# Patient Record
Sex: MALE | Race: WHITE | Employment: FULL TIME | URBAN - NONMETROPOLITAN AREA
[De-identification: names, ages, dates, MRNs, and addresses within clinical notes are randomized per-mention and may not be internally consistent; named-entity substitution may affect disease eponyms.]

---

## 2018-01-22 ENCOUNTER — OFFICE VISIT (OUTPATIENT)
Dept: FAMILY MEDICINE CLINIC | Age: 48
End: 2018-01-22

## 2018-01-22 VITALS
BODY MASS INDEX: 22.08 KG/M2 | HEIGHT: 72 IN | DIASTOLIC BLOOD PRESSURE: 66 MMHG | SYSTOLIC BLOOD PRESSURE: 101 MMHG | HEART RATE: 68 BPM | WEIGHT: 163 LBS | OXYGEN SATURATION: 94 %

## 2018-01-22 DIAGNOSIS — Z71.6 ENCOUNTER FOR SMOKING CESSATION COUNSELING: ICD-10-CM

## 2018-01-22 DIAGNOSIS — Z23 NEED FOR IMMUNIZATION AGAINST INFLUENZA: ICD-10-CM

## 2018-01-22 DIAGNOSIS — Z13.1 SCREENING FOR DIABETES MELLITUS: ICD-10-CM

## 2018-01-22 DIAGNOSIS — Z23 NEED FOR TETANUS BOOSTER: ICD-10-CM

## 2018-01-22 DIAGNOSIS — M79.671 FOOT PAIN, RIGHT: ICD-10-CM

## 2018-01-22 DIAGNOSIS — B07.0 PLANTAR WART OF RIGHT FOOT: ICD-10-CM

## 2018-01-22 DIAGNOSIS — Z23 NEED FOR STREPTOCOCCUS PNEUMONIAE VACCINATION: ICD-10-CM

## 2018-01-22 DIAGNOSIS — K21.9 GASTROESOPHAGEAL REFLUX DISEASE WITHOUT ESOPHAGITIS: ICD-10-CM

## 2018-01-22 DIAGNOSIS — Z13.0 SCREENING FOR DEFICIENCY ANEMIA: ICD-10-CM

## 2018-01-22 DIAGNOSIS — Z13.220 SCREENING FOR LIPID DISORDERS: ICD-10-CM

## 2018-01-22 DIAGNOSIS — J06.9 VIRAL UPPER RESPIRATORY INFECTION: Primary | ICD-10-CM

## 2018-01-22 LAB
A/G RATIO: 2.6 (ref 1.1–2.2)
ALBUMIN SERPL-MCNC: 4.4 G/DL (ref 3.4–5)
ALP BLD-CCNC: 67 U/L (ref 40–129)
ALT SERPL-CCNC: 14 U/L (ref 10–40)
ANION GAP SERPL CALCULATED.3IONS-SCNC: 9 MMOL/L (ref 3–16)
AST SERPL-CCNC: 16 U/L (ref 15–37)
BASOPHILS ABSOLUTE: 0.1 K/UL (ref 0–0.2)
BASOPHILS RELATIVE PERCENT: 0.8 %
BILIRUB SERPL-MCNC: 0.3 MG/DL (ref 0–1)
BUN BLDV-MCNC: 5 MG/DL (ref 7–20)
CALCIUM SERPL-MCNC: 9.4 MG/DL (ref 8.3–10.6)
CHLORIDE BLD-SCNC: 103 MMOL/L (ref 99–110)
CHOLESTEROL, TOTAL: 131 MG/DL (ref 0–199)
CO2: 30 MMOL/L (ref 21–32)
CREAT SERPL-MCNC: 1 MG/DL (ref 0.9–1.3)
EOSINOPHILS ABSOLUTE: 0.3 K/UL (ref 0–0.6)
EOSINOPHILS RELATIVE PERCENT: 2.9 %
GFR AFRICAN AMERICAN: >60
GFR NON-AFRICAN AMERICAN: >60
GLOBULIN: 1.7 G/DL
GLUCOSE BLD-MCNC: 73 MG/DL (ref 70–99)
HCT VFR BLD CALC: 44.2 % (ref 40.5–52.5)
HDLC SERPL-MCNC: 47 MG/DL (ref 40–60)
HEMOGLOBIN: 14.8 G/DL (ref 13.5–17.5)
LDL CHOLESTEROL CALCULATED: 59 MG/DL
LYMPHOCYTES ABSOLUTE: 2.3 K/UL (ref 1–5.1)
LYMPHOCYTES RELATIVE PERCENT: 24.6 %
MCH RBC QN AUTO: 32.9 PG (ref 26–34)
MCHC RBC AUTO-ENTMCNC: 33.5 G/DL (ref 31–36)
MCV RBC AUTO: 98.2 FL (ref 80–100)
MONOCYTES ABSOLUTE: 0.6 K/UL (ref 0–1.3)
MONOCYTES RELATIVE PERCENT: 6.8 %
NEUTROPHILS ABSOLUTE: 6.1 K/UL (ref 1.7–7.7)
NEUTROPHILS RELATIVE PERCENT: 64.9 %
PDW BLD-RTO: 13.3 % (ref 12.4–15.4)
PLATELET # BLD: 216 K/UL (ref 135–450)
PMV BLD AUTO: 9.6 FL (ref 5–10.5)
POTASSIUM SERPL-SCNC: 4.2 MMOL/L (ref 3.5–5.1)
RBC # BLD: 4.5 M/UL (ref 4.2–5.9)
SODIUM BLD-SCNC: 142 MMOL/L (ref 136–145)
TOTAL PROTEIN: 6.1 G/DL (ref 6.4–8.2)
TRIGL SERPL-MCNC: 123 MG/DL (ref 0–150)
VLDLC SERPL CALC-MCNC: 25 MG/DL
WBC # BLD: 9.5 K/UL (ref 4–11)

## 2018-01-22 PROCEDURE — 90688 IIV4 VACCINE SPLT 0.5 ML IM: CPT | Performed by: NURSE PRACTITIONER

## 2018-01-22 PROCEDURE — 99204 OFFICE O/P NEW MOD 45 MIN: CPT | Performed by: NURSE PRACTITIONER

## 2018-01-22 PROCEDURE — 90732 PPSV23 VACC 2 YRS+ SUBQ/IM: CPT | Performed by: NURSE PRACTITIONER

## 2018-01-22 PROCEDURE — 90472 IMMUNIZATION ADMIN EACH ADD: CPT | Performed by: NURSE PRACTITIONER

## 2018-01-22 PROCEDURE — 17110 DESTRUCTION B9 LES UP TO 14: CPT | Performed by: NURSE PRACTITIONER

## 2018-01-22 PROCEDURE — 90471 IMMUNIZATION ADMIN: CPT | Performed by: NURSE PRACTITIONER

## 2018-01-22 PROCEDURE — 90715 TDAP VACCINE 7 YRS/> IM: CPT | Performed by: NURSE PRACTITIONER

## 2018-01-22 RX ORDER — DEXLANSOPRAZOLE 60 MG/1
60 CAPSULE, DELAYED RELEASE ORAL DAILY
Qty: 10 CAPSULE | Refills: 0 | COMMUNITY
Start: 2018-01-22 | End: 2018-05-01

## 2018-01-22 RX ORDER — METHYLPREDNISOLONE 4 MG/1
TABLET ORAL
Qty: 1 KIT | Refills: 0 | Status: SHIPPED | OUTPATIENT
Start: 2018-01-22 | End: 2018-01-28

## 2018-01-22 RX ORDER — NICOTINE 21 MG/24HR
1 PATCH, TRANSDERMAL 24 HOURS TRANSDERMAL EVERY 24 HOURS
Qty: 30 PATCH | Refills: 1 | Status: SHIPPED | OUTPATIENT
Start: 2018-01-22 | End: 2018-03-29

## 2018-01-22 RX ORDER — GUAIFENESIN AND DEXTROMETHORPHAN HYDROBROMIDE 100; 10 MG/5ML; MG/5ML
5-10 SOLUTION ORAL EVERY 6 HOURS PRN
Qty: 100 ML | Refills: 0 | Status: SHIPPED | OUTPATIENT
Start: 2018-01-22 | End: 2018-02-01

## 2018-01-22 RX ORDER — OMEPRAZOLE 20 MG/1
20 CAPSULE, DELAYED RELEASE ORAL DAILY
Qty: 30 CAPSULE | Refills: 1 | Status: SHIPPED | OUTPATIENT
Start: 2018-01-22 | End: 2018-05-01 | Stop reason: ALTCHOICE

## 2018-01-22 ASSESSMENT — ENCOUNTER SYMPTOMS
STRIDOR: 0
GASTROINTESTINAL NEGATIVE: 1
EYES NEGATIVE: 1
WHEEZING: 0
VOICE CHANGE: 1
COUGH: 1
CHEST TIGHTNESS: 1
SINUS PRESSURE: 1
RHINORRHEA: 1
SORE THROAT: 1
SHORTNESS OF BREATH: 0

## 2018-01-22 ASSESSMENT — PATIENT HEALTH QUESTIONNAIRE - PHQ9
SUM OF ALL RESPONSES TO PHQ9 QUESTIONS 1 & 2: 0
1. LITTLE INTEREST OR PLEASURE IN DOING THINGS: 0
2. FEELING DOWN, DEPRESSED OR HOPELESS: 0
SUM OF ALL RESPONSES TO PHQ QUESTIONS 1-9: 0

## 2018-01-22 NOTE — PATIENT INSTRUCTIONS
self-care. Antibiotics are not used to treat a viral infection. That's because antibiotics will not help cure a viral illness. In some cases, antiviral medicine can help your body fight a serious viral infection. Follow-up care is a key part of your treatment and safety. Be sure to make and go to all appointments, and call your doctor if you are having problems. It's also a good idea to know your test results and keep a list of the medicines you take. How can you care for yourself at home? · Rest as much as possible until you feel better. · Be safe with medicines. Take your medicine exactly as prescribed. Call your doctor if you think you are having a problem with your medicine. You will get more details on the specific medicine your doctor prescribes. · Take an over-the-counter pain medicine, such as acetaminophen (Tylenol), ibuprofen (Advil, Motrin), or naproxen (Aleve), as needed for pain and fever. Read and follow all instructions on the label. Do not give aspirin to anyone younger than 20. It has been linked to Reye syndrome, a serious illness. · Drink plenty of fluids, enough so that your urine is light yellow or clear like water. Hot fluids, such as tea or soup, may help relieve congestion in your nose and throat. If you have kidney, heart, or liver disease and have to limit fluids, talk with your doctor before you increase the amount of fluids you drink. · Try to clear mucus from your lungs by breathing deeply and coughing. · Gargle with warm salt water once an hour. This can help reduce swelling and throat pain. Use 1 teaspoon of salt mixed in 1 cup of warm water. · Do not smoke or allow others to smoke around you. If you need help quitting, talk to your doctor about stop-smoking programs and medicines. These can increase your chances of quitting for good. To avoid spreading the virus  · Cough or sneeze into a tissue. Then throw the tissue away.   · If you don't have a tissue, use your hand to

## 2018-01-22 NOTE — PROGRESS NOTES
Vaccine Information Sheet, \"Influenza - Inactivated\"  given to Shahid Irizarry, or parent/legal guardian of  Shahid Irizarry and verbalized understanding. Patient responses:    Have you ever had a reaction to a flu vaccine? No  Are you able to eat eggs without adverse effects? Yes  Do you have any current illness? No  Have you ever had Guillian [place] Syndrome? No    Flu vaccine given per order. Please see immunization tab. Lab preformed in R arm and sent number of tubes below to be processed. 1 attempt made and stopped bleeding by applying band aide and guaze.      1 Red    1 purple    0 blue    0 Urine

## 2018-01-22 NOTE — PROGRESS NOTES
[facility] PHYSICIAN PRACTICES  [facility]  [place] [address]  [address] [address]  Dept: [phone]  Dept Fax: [phone]    Harrold Collet is a 56 y.o. male who presents today for his medical conditions/complaints as noted below. Harrold Collet is c/o of Established New Doctor; Cough (x 2 days ); and Abdominal Pain (burning)    Chief Complaint   Patient presents with   Heather oFng Established New Doctor    Cough     x 2 days     Abdominal Pain     burning     HPI:     Mr. Gorge Purcell presents to establish care within this practice. He hasn't been to a doctor in some time and is behind on some aspects of his health maintenance in which he wishes to get caught up on today. His primary presenting issue, however is a cough as noted below. Cough   This is a new problem. The current episode started in the past 7 days (Friday). The problem has been waxing and waning. The problem occurs constantly. The cough is non-productive. Associated symptoms include chest pain, nasal congestion, postnasal drip, rhinorrhea and a sore throat. Pertinent negatives include no shortness of breath or wheezing. The symptoms are aggravated by lying down. Risk factors for lung disease include occupational exposure and smoking/tobacco exposure. He has tried nothing for the symptoms. The treatment provided no relief. His past medical history is significant for asthma and environmental allergies.  smoking     Past Medical History:   Diagnosis Date    MRSA (methicillin resistant staph aureus) culture positive 9/9/11    Finger positive for MRSA    Recurrent otitis media of both ears     Smoking history       Past Surgical History:   Procedure Laterality Date    ORTHOPEDIC SURGERY      L elbow    TYMPANOSTOMY TUBE PLACEMENT      15 tubes in right, 16 tubes in left over the years     Family History   Problem Relation Age of Onset    Heart Attack Father      5 MI; ~40 at first MI   Heather Fong High Cholesterol Father     Asthma Father    Heather Fong to person, place, and time. He appears well-developed and well-nourished. He does not have a sickly appearance. He does not appear ill. HENT:   Head: Normocephalic. Mouth/Throat: No oral lesions. Posterior oropharyngeal erythema present. No oropharyngeal exudate. Eyes: Pupils are equal, round, and reactive to light. Neck: Normal range of motion. Cardiovascular: Normal rate, regular rhythm, S1 normal, S2 normal and normal heart sounds. Pulmonary/Chest: Effort normal and breath sounds normal.   Abdominal: Soft. Bowel sounds are normal.   Musculoskeletal: Normal range of motion. Lymphadenopathy:        Head (right side): Submandibular adenopathy present. Head (left side): Submandibular adenopathy present. Neurological: He is alert and oriented to person, place, and time. Skin: Skin is warm and dry. Psychiatric: He has a normal mood and affect.      Admission on 04/28/2015, Discharged on 04/28/2015   Component Date Value Ref Range Status    Ventricular Rate 04/28/2015 77  BPM Final    Atrial Rate 04/28/2015 77  BPM Final    P-R Interval 04/28/2015 170  ms Final    QRS Duration 04/28/2015 110  ms Final    Q-T Interval 04/28/2015 386  ms Final    QTc Calculation (Bazett) 04/28/2015 436  ms Final    P Axis 04/28/2015 74  degrees Final    R Axis 04/28/2015 -37  degrees Final    T Axis 04/28/2015 61  degrees Final    Diagnosis 04/28/2015 Normal sinus rhythmLeft axis deviationIncomplete right bundle branch blockAbnormal ECGConfirmed by SERJIO BALDWIN MD ([id number]) on 4/28/2015 11:01:25 AM   Final    WBC 04/28/2015 11.5* 4.0 - 11.0 K/uL Final    RBC 04/28/2015 4.84  4.20 - 5.90 M/uL Final    Hemoglobin 04/28/2015 15.6  13.5 - 17.5 g/dL Final    Hematocrit 04/28/2015 46.2  40.5 - 52.5 % Final    MCV 04/28/2015 95.3  80.0 - 100.0 fL Final    MCH 04/28/2015 32.3  26.0 - 34.0 pg Final    MCHC 04/28/2015 33.9  31.0 - 36.0 g/dL Final    RDW 04/28/2015 12.7  12.4 - 15.4 % Final    Platelets

## 2018-03-29 ENCOUNTER — OFFICE VISIT (OUTPATIENT)
Dept: FAMILY MEDICINE CLINIC | Age: 48
End: 2018-03-29

## 2018-03-29 VITALS
DIASTOLIC BLOOD PRESSURE: 88 MMHG | OXYGEN SATURATION: 98 % | SYSTOLIC BLOOD PRESSURE: 121 MMHG | BODY MASS INDEX: 22.24 KG/M2 | WEIGHT: 164 LBS | HEART RATE: 62 BPM

## 2018-03-29 DIAGNOSIS — B07.0 PLANTAR WART, RIGHT FOOT: Primary | ICD-10-CM

## 2018-03-29 PROCEDURE — 99213 OFFICE O/P EST LOW 20 MIN: CPT | Performed by: NURSE PRACTITIONER

## 2018-03-29 PROCEDURE — 17110 DESTRUCTION B9 LES UP TO 14: CPT | Performed by: NURSE PRACTITIONER

## 2018-03-29 ASSESSMENT — ENCOUNTER SYMPTOMS
COLOR CHANGE: 0
SHORTNESS OF BREATH: 0
SORE THROAT: 0
CHEST TIGHTNESS: 0
SINUS PAIN: 0

## 2018-03-29 NOTE — PROGRESS NOTES
[facility] PHYSICIAN PRACTICES  [facility]  [place] [address]  Dept: [phone]  Dept Fax: [phone]    Andrea Vega is a 56 y.o. male who presents today for his medical conditions/complaints as noted below. Andrea Vega is c/o of Foot Problem (right foot/ warts )    Chief Complaint   Patient presents with    Foot Problem     right foot/ warts      HPI:     Foot Problem   This is a recurrent problem. The current episode started more than 1 month ago (6 months). The problem occurs constantly. The problem has been gradually worsening. Pertinent negatives include no arthralgias, chest pain, congestion, fatigue, myalgias, rash, sore throat or weakness. Associated symptoms comments: Foot pain with pressure  . The symptoms are aggravated by walking. Treatments tried: cryotherapy, filing, corn cushions. The treatment provided no relief. Subjective:     Review of Systems   Constitutional: Negative for activity change and fatigue. HENT: Negative for congestion, sinus pain and sore throat. Respiratory: Negative for chest tightness and shortness of breath. Cardiovascular: Negative for chest pain, palpitations and leg swelling. Musculoskeletal: Negative for arthralgias and myalgias. Skin: Negative for color change, pallor, rash and wound. Plantar warts to the right foot     Neurological: Negative for dizziness, weakness and light-headedness. Objective:     Vitals:    03/29/18 1714   BP: 121/88   Site: Right Arm   Position: Sitting   Cuff Size: Small Adult   Pulse: 62   SpO2: 98%   Weight: 164 lb (74.4 kg)     Physical Exam   Constitutional: He appears well-developed and well-nourished. HENT:   Right Ear: External ear normal.   Left Ear: External ear normal.   Nose: Nose normal.   Mouth/Throat: Oropharynx is clear and moist.   Eyes: Pupils are equal, round, and reactive to light. Neck: Normal range of motion.    Cardiovascular: Normal rate, regular

## 2018-03-29 NOTE — PATIENT INSTRUCTIONS
comfortable. · Take an over-the-counter medicine, such as acetaminophen (Tylenol), ibuprofen (Advil, Motrin), or naproxen (Aleve) if you have pain. Read and follow all instructions on the label. · Do not take two or more pain medicines at the same time unless the doctor told you to. Many pain medicines have acetaminophen, which is Tylenol. Too much acetaminophen (Tylenol) can be harmful. When should you call for help? Call your doctor now or seek immediate medical care if:  ? · You have signs of infection, such as:  ¨ Increased pain, swelling, warmth, or redness. ¨ Red streaks leading from a wart. ¨ Pus draining from a wart. ¨ A fever. ? Watch closely for changes in your health, and be sure to contact your doctor if:  ? · You do not get better as expected. Where can you learn more? Go to [email or web address].com.[email or web address]. org and sign in to your [employer] account. Enter S429 in the [employer] box to learn more about \"Plantar Warts: Care Instructions. \"     If you do not have an account, please click on the \"Sign Up Now\" link. Current as of: October 13, 2016  Content Version: 11.5  © 1239-9184 Healthwise, Incorporated. Care instructions adapted under license by [facility] ([facility]). If you have questions about a medical condition or this instruction, always ask your healthcare professional. [place] [address] any warranty or liability for your use of this information.

## 2018-05-01 ENCOUNTER — OFFICE VISIT (OUTPATIENT)
Dept: FAMILY MEDICINE CLINIC | Age: 48
End: 2018-05-01

## 2018-05-01 VITALS
HEIGHT: 72 IN | DIASTOLIC BLOOD PRESSURE: 78 MMHG | SYSTOLIC BLOOD PRESSURE: 116 MMHG | WEIGHT: 162 LBS | OXYGEN SATURATION: 97 % | HEART RATE: 67 BPM | BODY MASS INDEX: 21.94 KG/M2 | TEMPERATURE: 97.3 F

## 2018-05-01 DIAGNOSIS — H66.004 RECURRENT ACUTE SUPPURATIVE OTITIS MEDIA OF RIGHT EAR WITHOUT SPONTANEOUS RUPTURE OF TYMPANIC MEMBRANE: Primary | ICD-10-CM

## 2018-05-01 DIAGNOSIS — J01.91 ACUTE RECURRENT SINUSITIS, UNSPECIFIED LOCATION: ICD-10-CM

## 2018-05-01 PROCEDURE — 99214 OFFICE O/P EST MOD 30 MIN: CPT | Performed by: NURSE PRACTITIONER

## 2018-05-01 RX ORDER — FLUTICASONE PROPIONATE 50 MCG
1 SPRAY, SUSPENSION (ML) NASAL DAILY
Qty: 1 BOTTLE | Refills: 3 | Status: SHIPPED | OUTPATIENT
Start: 2018-05-01 | End: 2019-07-17

## 2018-05-01 RX ORDER — AMOXICILLIN 875 MG/1
875 TABLET, COATED ORAL 2 TIMES DAILY
Qty: 14 TABLET | Refills: 0 | Status: SHIPPED | OUTPATIENT
Start: 2018-05-01 | End: 2018-05-08

## 2018-05-01 ASSESSMENT — ENCOUNTER SYMPTOMS
CONSTIPATION: 0
RHINORRHEA: 0
VOICE CHANGE: 1
SORE THROAT: 0
COUGH: 0
SINUS PRESSURE: 1
DIARRHEA: 0
CHEST TIGHTNESS: 0
VOMITING: 0
SINUS PAIN: 0
PHOTOPHOBIA: 0
NAUSEA: 0
SHORTNESS OF BREATH: 0

## 2018-07-23 ENCOUNTER — HOSPITAL ENCOUNTER (EMERGENCY)
Age: 48
Discharge: HOME | End: 2018-07-23
Attending: EMERGENCY MEDICINE
Payer: COMMERCIAL

## 2018-07-23 VITALS
WEIGHT: 165 LBS | TEMPERATURE: 97.8 F | HEART RATE: 70 BPM | SYSTOLIC BLOOD PRESSURE: 130 MMHG | HEIGHT: 72 IN | OXYGEN SATURATION: 99 % | RESPIRATION RATE: 16 BRPM | DIASTOLIC BLOOD PRESSURE: 85 MMHG | BODY MASS INDEX: 22.35 KG/M2

## 2018-07-23 DIAGNOSIS — S61.215A LACERATION OF LEFT RING FINGER WITHOUT FOREIGN BODY WITHOUT DAMAGE TO NAIL, INITIAL ENCOUNTER: ICD-10-CM

## 2018-07-23 DIAGNOSIS — S61.217A LACERATION OF LEFT LITTLE FINGER WITHOUT FOREIGN BODY WITHOUT DAMAGE TO NAIL, INITIAL ENCOUNTER: Primary | ICD-10-CM

## 2018-07-23 PROCEDURE — 4500000022 HC ED LEVEL 2 PROCEDURE

## 2018-07-23 PROCEDURE — 99282 EMERGENCY DEPT VISIT SF MDM: CPT

## 2018-07-23 NOTE — ED NOTES
Patient wound cleaned and dressed post suture per Esthela Alert, EMT-P. Patient given discharge instructions and instructions for wound care and verbalized understanding.      Flavio Griffin RN  07/23/18 5065

## 2018-08-05 ENCOUNTER — HOSPITAL ENCOUNTER (EMERGENCY)
Age: 48
Discharge: HOME | End: 2018-08-05
Attending: EMERGENCY MEDICINE
Payer: COMMERCIAL

## 2018-08-05 VITALS
BODY MASS INDEX: 22.75 KG/M2 | WEIGHT: 168 LBS | HEART RATE: 84 BPM | OXYGEN SATURATION: 84 % | TEMPERATURE: 98.3 F | HEIGHT: 72 IN | SYSTOLIC BLOOD PRESSURE: 146 MMHG | DIASTOLIC BLOOD PRESSURE: 79 MMHG | RESPIRATION RATE: 16 BRPM

## 2018-08-05 DIAGNOSIS — L02.511 ABSCESS OF RIGHT MIDDLE FINGER: ICD-10-CM

## 2018-08-05 DIAGNOSIS — S60.452A FOREIGN BODY OF RIGHT MIDDLE FINGER: Primary | ICD-10-CM

## 2018-08-05 PROCEDURE — 4500000023 HC ED LEVEL 3 PROCEDURE

## 2018-08-05 PROCEDURE — 6370000000 HC RX 637 (ALT 250 FOR IP): Performed by: EMERGENCY MEDICINE

## 2018-08-05 PROCEDURE — 99283 EMERGENCY DEPT VISIT LOW MDM: CPT

## 2018-08-05 RX ORDER — CEFDINIR 300 MG/1
300 CAPSULE ORAL ONCE
Status: COMPLETED | OUTPATIENT
Start: 2018-08-05 | End: 2018-08-05

## 2018-08-05 RX ORDER — CEFDINIR 300 MG/1
300 CAPSULE ORAL 2 TIMES DAILY
Qty: 20 CAPSULE | Refills: 0 | Status: SHIPPED | OUTPATIENT
Start: 2018-08-05 | End: 2020-05-20 | Stop reason: SDUPTHER

## 2018-08-05 RX ADMIN — CEFDINIR 300 MG: 300 CAPSULE ORAL at 18:12

## 2018-08-05 NOTE — ED NOTES
Wound care done to right hand and dressing in place. Sutures removed from left hand. Pt voices understanding of medicaiton use and follow up. Voices understanding of wound care.  Denies any questions     Jack Travis, SOFIA  08/05/18 6115

## 2018-08-08 NOTE — ED PROVIDER NOTES
 Recurrent otitis media of both ears     Smoking history          SURGICAL HISTORY       Past Surgical History:   Procedure Laterality Date    ORTHOPEDIC SURGERY      L elbow    TYMPANOSTOMY TUBE PLACEMENT      15 tubes in right, 16 tubes in left over the years         CURRENT MEDICATIONS       Discharge Medication List as of 8/5/2018  5:58 PM      CONTINUE these medications which have NOT CHANGED    Details   fluticasone (FLONASE) 50 MCG/ACT nasal spray 1 spray by Nasal route daily, Disp-1 Bottle, R-3Normal               ALLERGIES     Pcn [penicillins]    FAMILY HISTORY       Family History   Problem Relation Age of Onset    Heart Attack Father         5 MI; ~40 at first MI   Corazon Lima High Cholesterol Father     Asthma Father     Diabetes Father     Hypertension Father     No Known Problems Brother     Mult Sclerosis Maternal Grandfather           SOCIAL HISTORY       Social History     Social History    Marital status: Legally      Spouse name: N/A    Number of children: N/A    Years of education: N/A     Social History Main Topics    Smoking status: Current Every Day Smoker     Packs/day: 1.00     Types: Cigarettes    Smokeless tobacco: Current User     Types: Chew    Alcohol use Yes      Comment: only occasionally    Drug use: No    Sexual activity: Yes     Partners: Female     Other Topics Concern    None     Social History Narrative    None       SCREENINGS             PHYSICAL EXAM    (up to 7 for level 4, 8 or more for level 5)     ED Triage Vitals [08/05/18 1716]   BP Temp Temp Source Pulse Resp SpO2 Height Weight   (!) 146/79 98.3 °F (36.8 °C) Oral 84 16 (!) 84 % 6' (1.829 m) 168 lb (76.2 kg)       Physical Exam   Constitutional: He is oriented to person, place, and time. He appears well-developed and well-nourished. No distress. Musculoskeletal: Normal range of motion. Right hand: He exhibits tenderness and swelling. Normal sensation noted.         Hands:  Neurological: He is alert and oriented to person, place, and time. Skin: Skin is warm and dry. DIAGNOSTIC RESULTS   LABS:    No results found for this visit on 08/05/18. All other labs were within normal range or not returned as of this dictation. EKG: All EKG's are interpreted by the Emergency Department Physician who either signs or Co-signs this chart in the absence of a cardiologist.  Please see their note for interpretation of EKG. RADIOLOGY:   Non-plain film images such as CT, Ultrasound and MRI are read by the radiologist. Plain radiographic images are visualized and preliminarily interpreted by the  ED Provider with the below findings:    Interpretation per the Radiologist below, if available at the time of this note:    No orders to display         PROCEDURES   Unless otherwise noted below, none     Incision/Drainage  Date/Time: 8/8/2018 4:14 PM  Performed by: Jodi Lake by: Meche Ding     Consent:     Consent obtained:  Verbal    Consent given by:  Patient    Risks discussed:  Bleeding, incomplete drainage, pain and infection    Alternatives discussed:  No treatment, observation and referral  Location:     Type:  Abscess    Location:  Upper extremity    Upper extremity location:  Finger    Finger location:  R long finger  Pre-procedure details:     Skin preparation:  Betadine  Anesthesia (see MAR for exact dosages):      Anesthesia method:  Nerve block and local infiltration    Local anesthetic:  Lidocaine 1% w/o epi    Block location:  Right middle finger proximally    Block needle gauge:  30 G    Block anesthetic:  Lidocaine 1% w/o epi and bupivacaine 0.25% w/o epi    Block injection procedure:  Anatomic landmarks identified, introduced needle, incremental injection and negative aspiration for blood    Block outcome:  Incomplete block  Procedure type:     Complexity:  Complex  Procedure details:     Needle aspiration: no      Incision types:  Single straight    Incision depth: Dermal    Scalpel blade:  11    Wound management:  Probed and deloculated, extensive cleaning and debrided (small wood splinter removed)    Drainage:  Bloody and purulent    Drainage amount: Moderate    Wound treatment:  Wound left open    Packing materials:  None  Post-procedure details:     Patient tolerance of procedure: Tolerated well, no immediate complications        CRITICAL CARE TIME   N/A    CONSULTS:  None      EMERGENCY DEPARTMENT COURSE and DIFFERENTIAL DIAGNOSIS/MDM:   Vitals:    Vitals:    08/05/18 1716   BP: (!) 146/79   Pulse: 84   Resp: 16   Temp: 98.3 °F (36.8 °C)   TempSrc: Oral   SpO2: (!) 84%   Weight: 168 lb (76.2 kg)   Height: 6' (1.829 m)       Patient was given the following medications:  Medications   cefdinir (OMNICEF) capsule 300 mg (300 mg Oral Given 8/5/18 1812)     Patient had retained foreign body of his right middle finger. This was removed. The wound was copiously irrigated. I started him on antibiotics. I have gone over signs and symptoms of worsening infection. I want him to follow up with hand surgery closely and I provided referral.  Patient will need to follow-up closely. Patient and his wife are agreeable with this plan. I estimate there is LOW risk for CELLULITIS, COMPARTMENT SYNDROME, NECROTIZING FASCIITIS, TENDON OR NEUROVASCULAR INJURY, or FOREIGN BODY, thus I consider the discharge disposition reasonable. Also, there is no evidence or peritonitis, sepsis, or toxicity. Chago Sainz and I have discussed the diagnosis and risks, and we agree with discharging home to follow-up with their primary doctor. We also discussed returning to the Emergency Department immediately if new or worsening symptoms occur. We have discussed the symptoms which are most concerning (e.g., changing or worsening pain, fever, numbness, weakness, cool or painful digits) that necessitate immediate return. The patient understands the importance of follow up and reasons to return.     FINAL

## 2019-01-10 ENCOUNTER — OFFICE VISIT (OUTPATIENT)
Dept: FAMILY MEDICINE CLINIC | Age: 49
End: 2019-01-10
Payer: COMMERCIAL

## 2019-01-10 VITALS
TEMPERATURE: 97 F | OXYGEN SATURATION: 97 % | HEART RATE: 85 BPM | BODY MASS INDEX: 22.24 KG/M2 | SYSTOLIC BLOOD PRESSURE: 110 MMHG | DIASTOLIC BLOOD PRESSURE: 73 MMHG | WEIGHT: 164 LBS

## 2019-01-10 DIAGNOSIS — H92.03 OTALGIA OF BOTH EARS: Primary | ICD-10-CM

## 2019-01-10 DIAGNOSIS — H61.23 EXCESSIVE CERUMEN IN BOTH EAR CANALS: ICD-10-CM

## 2019-01-10 PROCEDURE — 99214 OFFICE O/P EST MOD 30 MIN: CPT | Performed by: NURSE PRACTITIONER

## 2019-01-10 RX ORDER — AMOXICILLIN AND CLAVULANATE POTASSIUM 875; 125 MG/1; MG/1
1 TABLET, FILM COATED ORAL 2 TIMES DAILY
Qty: 14 TABLET | Refills: 0 | Status: SHIPPED | OUTPATIENT
Start: 2019-01-10 | End: 2019-01-17

## 2019-01-10 ASSESSMENT — ENCOUNTER SYMPTOMS
CONSTIPATION: 0
SHORTNESS OF BREATH: 0
NAUSEA: 0
DIARRHEA: 0
VOMITING: 0
COUGH: 0
SINUS PAIN: 0
SORE THROAT: 0
WHEEZING: 0
RHINORRHEA: 1

## 2019-07-17 ENCOUNTER — APPOINTMENT (OUTPATIENT)
Dept: GENERAL RADIOLOGY | Age: 49
End: 2019-07-17
Payer: COMMERCIAL

## 2019-07-17 ENCOUNTER — HOSPITAL ENCOUNTER (EMERGENCY)
Age: 49
Discharge: HOME | End: 2019-07-17
Payer: COMMERCIAL

## 2019-07-17 VITALS
SYSTOLIC BLOOD PRESSURE: 113 MMHG | BODY MASS INDEX: 22.89 KG/M2 | RESPIRATION RATE: 18 BRPM | DIASTOLIC BLOOD PRESSURE: 73 MMHG | HEART RATE: 65 BPM | OXYGEN SATURATION: 99 % | TEMPERATURE: 97.7 F | WEIGHT: 169 LBS | HEIGHT: 72 IN

## 2019-07-17 DIAGNOSIS — M25.532 LEFT WRIST PAIN: Primary | ICD-10-CM

## 2019-07-17 PROCEDURE — 6370000000 HC RX 637 (ALT 250 FOR IP): Performed by: NURSE PRACTITIONER

## 2019-07-17 PROCEDURE — 73110 X-RAY EXAM OF WRIST: CPT

## 2019-07-17 PROCEDURE — L3809 WHFO W/O JOINTS PRE OTS: HCPCS

## 2019-07-17 PROCEDURE — 73120 X-RAY EXAM OF HAND: CPT

## 2019-07-17 PROCEDURE — 99283 EMERGENCY DEPT VISIT LOW MDM: CPT

## 2019-07-17 RX ORDER — NAPROXEN 500 MG/1
500 TABLET ORAL 2 TIMES DAILY
Qty: 20 TABLET | Refills: 0 | Status: SHIPPED | OUTPATIENT
Start: 2019-07-17 | End: 2020-05-20

## 2019-07-17 RX ORDER — NAPROXEN 250 MG/1
250 TABLET ORAL ONCE
Status: COMPLETED | OUTPATIENT
Start: 2019-07-17 | End: 2019-07-17

## 2019-07-17 RX ADMIN — NAPROXEN 250 MG: 250 TABLET ORAL at 10:41

## 2019-07-17 ASSESSMENT — PAIN DESCRIPTION - LOCATION: LOCATION: WRIST

## 2019-07-17 ASSESSMENT — ENCOUNTER SYMPTOMS
ABDOMINAL PAIN: 0
SHORTNESS OF BREATH: 0

## 2019-07-17 ASSESSMENT — PAIN DESCRIPTION - DESCRIPTORS: DESCRIPTORS: BURNING;THROBBING

## 2019-07-17 ASSESSMENT — PAIN SCALES - GENERAL: PAINLEVEL_OUTOF10: 6

## 2020-03-10 ENCOUNTER — APPOINTMENT (OUTPATIENT)
Dept: GENERAL RADIOLOGY | Age: 50
End: 2020-03-10
Payer: COMMERCIAL

## 2020-03-10 ENCOUNTER — HOSPITAL ENCOUNTER (EMERGENCY)
Age: 50
Discharge: HOME | End: 2020-03-10
Payer: COMMERCIAL

## 2020-03-10 VITALS
BODY MASS INDEX: 23.03 KG/M2 | OXYGEN SATURATION: 98 % | WEIGHT: 170 LBS | SYSTOLIC BLOOD PRESSURE: 119 MMHG | TEMPERATURE: 98.1 F | DIASTOLIC BLOOD PRESSURE: 82 MMHG | HEART RATE: 67 BPM | RESPIRATION RATE: 18 BRPM | HEIGHT: 72 IN

## 2020-03-10 PROCEDURE — 99283 EMERGENCY DEPT VISIT LOW MDM: CPT

## 2020-03-10 PROCEDURE — 71046 X-RAY EXAM CHEST 2 VIEWS: CPT

## 2020-03-10 RX ORDER — AZITHROMYCIN 250 MG/1
250 TABLET, FILM COATED ORAL SEE ADMIN INSTRUCTIONS
Qty: 6 TABLET | Refills: 0 | Status: SHIPPED | OUTPATIENT
Start: 2020-03-10 | End: 2020-05-12 | Stop reason: SDUPTHER

## 2020-03-10 ASSESSMENT — PAIN DESCRIPTION - LOCATION: LOCATION: EAR

## 2020-03-10 ASSESSMENT — PAIN DESCRIPTION - PAIN TYPE: TYPE: ACUTE PAIN

## 2020-03-10 ASSESSMENT — PAIN SCALES - GENERAL: PAINLEVEL_OUTOF10: 6

## 2020-03-10 NOTE — ED PROVIDER NOTES
Diabetes Father     Hypertension Father     No Known Problems Brother     Mult Sclerosis Maternal Grandfather      Social History     Occupational History    Not on file   Tobacco Use    Smoking status: Current Every Day Smoker     Packs/day: 1.00     Types: Cigarettes    Smokeless tobacco: Former User     Types: Chew   Substance and Sexual Activity    Alcohol use: Yes     Comment: only occasionally    Drug use: No    Sexual activity: Yes     Partners: Female     No current facility-administered medications on file prior to encounter. Current Outpatient Medications on File Prior to Encounter   Medication Sig Dispense Refill    naproxen (NAPROSYN) 500 MG tablet Take 1 tablet by mouth 2 times daily for 20 doses 20 tablet 0     Allergies   Allergen Reactions    Pcn [Penicillins]      \"I was told that I was deathly allergic\"      Tolerates amoxicillin (Macario Boyer, APRN-CNP)       REVIEW OF SYSTEMS  10 systems reviewed, pertinent positives per HPI otherwise noted to be negative    PHYSICAL EXAM  /82   Pulse 67   Temp 98.1 °F (36.7 °C) (Oral)   Resp 18   Ht 6' (1.829 m)   Wt 170 lb (77.1 kg)   SpO2 98%   BMI 23.06 kg/m²     Physical Exam  Vitals signs and nursing note reviewed. Constitutional:       General: He is not in acute distress. Appearance: He is well-developed. He is not toxic-appearing or diaphoretic. HENT:      Head: Normocephalic and atraumatic. Right Ear: Tympanic membrane normal. Drainage present. A PE tube is present. Left Ear: Tympanic membrane is scarred. Nose: Nose normal.      Mouth/Throat:      Pharynx: Uvula midline. No oropharyngeal exudate or posterior oropharyngeal erythema. Eyes:      General:         Right eye: No discharge. Left eye: No discharge. Conjunctiva/sclera: Conjunctivae normal.      Pupils: Pupils are equal, round, and reactive to light. Neck:      Musculoskeletal: Normal range of motion and neck supple. Cardiovascular:      Rate and Rhythm: Normal rate. Heart sounds: Normal heart sounds. No murmur. Pulmonary:      Effort: Pulmonary effort is normal. No respiratory distress. Breath sounds: Normal breath sounds. No wheezing. Abdominal:      General: Bowel sounds are normal.      Palpations: Abdomen is soft. Tenderness: There is no abdominal tenderness. Skin:     General: Skin is warm. Findings: No rash. Neurological:      Mental Status: He is alert and oriented to person, place, and time. Coordination: Coordination normal.      Gait: Gait normal.   Psychiatric:         Behavior: Behavior normal.         Thought Content: Thought content normal.         Judgment: Judgment normal.         Labs:    Labs Reviewed - No data to display    All other labs were within normal range or not returned as of this dictation. EKG: All EKG's are interpreted by the Emergency Department Physician who either signs or Co-signs this chart in the absence of a cardiologist.Please see their note for interpretation of EKG. RADIOLOGY:   Non-plain film images such as CT, Ultrasound and MRI are read by the radiologist. Plain radiographic images are visualized and preliminarily interpreted by the  ED Provider with the below findings:    Interpretation per the Radiologistbelow, if available at the time of this note:    XR CHEST STANDARD (2 VW)   Final Result   No acute process. Xr Chest Standard (2 Vw)    Result Date: 3/10/2020  EXAMINATION: TWO XRAY VIEWS OF THE CHEST 3/10/2020 12:42 pm COMPARISON: 04/28/2015 HISTORY: ORDERING SYSTEM PROVIDED HISTORY: productive cough TECHNOLOGIST PROVIDED HISTORY: Reason for exam:->productive cough Reason for Exam: pt c/o coughing and sinus issues x3days Acuity: Acute Type of Exam: Initial FINDINGS: The lungs are without acute focal process. There is no effusion or pneumothorax. The cardiomediastinal silhouette is stable. The osseous structures are stable. days 2 - 5    NEOMYCIN-POLYMYXIN-HYDROCORTISONE (CORTISPORIN) 3.5-13632-4 OTIC SOLUTION    Place 4 drops into the right ear 3 times daily for 10 days Instill into right Ear       CLINICAL IMPRESSION  1. Bronchitis    2. Productive cough    3. Right otitis media, unspecified otitis media type    4. Smoking        Blood pressure 119/82, pulse 67, temperature 98.1 °F (36.7 °C), temperature source Oral, resp. rate 18, height 6' (1.829 m), weight 170 lb (77.1 kg), SpO2 98 %. DISPOSITION  DISPOSITION Decision To Discharge 03/10/2020 12:52:57 PM      PATIENT REFERRED TO:  GEORGE Moura CNP  [address] [address]    Schedule an appointment as soon as possible for a visit in 3 days  follow up    [facility] [place] [facility]  [address] [facility] [address]  Go to   for concerns, worsening or emergent symptoms      DISCHARGE MEDICATIONS:  New Prescriptions    AZITHROMYCIN (ZITHROMAX) 250 MG TABLET    Take 1 tablet by mouth See Admin Instructions for 5 days 500mg on day 1 followed by 250mg on days 2 - 5    NEOMYCIN-POLYMYXIN-HYDROCORTISONE (CORTISPORIN) 3.5-27690-5 OTIC SOLUTION    Place 4 drops into the right ear 3 times daily for 10 days Instill into right Ear       DISCONTINUED MEDICATIONS:  Discontinued Medications    No medications on file                GEORGE Hutchins CNP (electronically signed)     Acute Care [facility]    Please note that this chart was generated using Dragon dictation software. Although every effort was made to ensure the accuracy of this automated transcription, some errors intranscription may have occurred      I have independently evaluated this patient. A physician was available for consult.         GEORGE Hutchins CNP  03/10/20 9549

## 2020-05-12 ENCOUNTER — OFFICE VISIT (OUTPATIENT)
Dept: INTERNAL MEDICINE CLINIC | Age: 50
End: 2020-05-12
Payer: COMMERCIAL

## 2020-05-12 VITALS
DIASTOLIC BLOOD PRESSURE: 80 MMHG | TEMPERATURE: 97.7 F | WEIGHT: 170 LBS | SYSTOLIC BLOOD PRESSURE: 138 MMHG | OXYGEN SATURATION: 99 % | BODY MASS INDEX: 23.06 KG/M2 | HEART RATE: 78 BPM

## 2020-05-12 PROCEDURE — 99212 OFFICE O/P EST SF 10 MIN: CPT | Performed by: INTERNAL MEDICINE

## 2020-05-12 RX ORDER — AZITHROMYCIN 250 MG/1
250 TABLET, FILM COATED ORAL SEE ADMIN INSTRUCTIONS
Qty: 6 TABLET | Refills: 0 | Status: SHIPPED | OUTPATIENT
Start: 2020-05-12 | End: 2020-05-17

## 2020-05-12 ASSESSMENT — ENCOUNTER SYMPTOMS
ABDOMINAL DISTENTION: 0
VOMITING: 0
CHEST TIGHTNESS: 0
BACK PAIN: 0
DIARRHEA: 0
COUGH: 0
CONSTIPATION: 0
RHINORRHEA: 0
NAUSEA: 0
SHORTNESS OF BREATH: 0
WHEEZING: 0
SINUS PAIN: 0

## 2020-05-12 ASSESSMENT — PATIENT HEALTH QUESTIONNAIRE - PHQ9
1. LITTLE INTEREST OR PLEASURE IN DOING THINGS: 0
2. FEELING DOWN, DEPRESSED OR HOPELESS: 0
SUM OF ALL RESPONSES TO PHQ QUESTIONS 1-9: 0
SUM OF ALL RESPONSES TO PHQ QUESTIONS 1-9: 0
SUM OF ALL RESPONSES TO PHQ9 QUESTIONS 1 & 2: 0

## 2020-05-12 NOTE — PROGRESS NOTES
oropharyngeal exudate. Neck:      Musculoskeletal: Neck supple. Thyroid: No thyromegaly. Cardiovascular:      Rate and Rhythm: Normal rate and regular rhythm. Heart sounds: Normal heart sounds. No murmur. No friction rub. No gallop. Pulmonary:      Effort: Pulmonary effort is normal. No respiratory distress. Breath sounds: Normal breath sounds. No wheezing or rales. Neurological:      Mental Status: He is alert and oriented to person, place, and time. Vitals:    05/12/20 1054   BP: 138/80   Pulse: 78   Temp: 97.7 °F (36.5 °C)   SpO2: 99%         ASSESSMENT/PLAN:  1. Non-recurrent acute suppurative otitis media of both ears without spontaneous rupture of tympanic membranes  Tx options discussed with pt. Recommend apap prn pain. Discussed use, benefit, and side effects of prescribed medications. Barriers to compliance discussed. All patient questions answered. Pt voiced understanding. Call if no improvement or worsening symptoms  - azithromycin (ZITHROMAX) 250 MG tablet; Take 1 tablet by mouth See Admin Instructions for 5 days 500mg on day 1 followed by 250mg on days 2 - 5  Dispense: 6 tablet;  Refill: 0

## 2020-05-20 ENCOUNTER — OFFICE VISIT (OUTPATIENT)
Dept: INTERNAL MEDICINE CLINIC | Age: 50
End: 2020-05-20
Payer: COMMERCIAL

## 2020-05-20 VITALS
BODY MASS INDEX: 23.16 KG/M2 | DIASTOLIC BLOOD PRESSURE: 80 MMHG | HEART RATE: 90 BPM | OXYGEN SATURATION: 98 % | TEMPERATURE: 97.7 F | SYSTOLIC BLOOD PRESSURE: 120 MMHG | HEIGHT: 72 IN | WEIGHT: 171 LBS

## 2020-05-20 PROCEDURE — 99213 OFFICE O/P EST LOW 20 MIN: CPT | Performed by: NURSE PRACTITIONER

## 2020-05-20 RX ORDER — CEFDINIR 300 MG/1
300 CAPSULE ORAL 2 TIMES DAILY
Qty: 20 CAPSULE | Refills: 0 | Status: SHIPPED | OUTPATIENT
Start: 2020-05-20 | End: 2020-05-27

## 2020-05-20 RX ORDER — NEOMYCIN SULFATE, POLYMYXIN B SULFATE, HYDROCORTISONE 3.5; 10000; 1 MG/ML; [USP'U]/ML; MG/ML
SOLUTION/ DROPS AURICULAR (OTIC)
COMMUNITY
Start: 2020-05-12 | End: 2022-04-11

## 2020-05-20 ASSESSMENT — ENCOUNTER SYMPTOMS
SORE THROAT: 0
WHEEZING: 0
NAUSEA: 0
COUGH: 0
VOICE CHANGE: 0
DIARRHEA: 0
RHINORRHEA: 1
VOMITING: 0
CHEST TIGHTNESS: 0
CONSTIPATION: 0
SINUS PRESSURE: 0
SHORTNESS OF BREATH: 0
SINUS PAIN: 0

## 2020-05-20 ASSESSMENT — PATIENT HEALTH QUESTIONNAIRE - PHQ9
SUM OF ALL RESPONSES TO PHQ QUESTIONS 1-9: 0
SUM OF ALL RESPONSES TO PHQ QUESTIONS 1-9: 0
1. LITTLE INTEREST OR PLEASURE IN DOING THINGS: 0
SUM OF ALL RESPONSES TO PHQ9 QUESTIONS 1 & 2: 0
2. FEELING DOWN, DEPRESSED OR HOPELESS: 0

## 2020-05-20 NOTE — PROGRESS NOTES
[address] [facility]  [address]  Tel:[phone]    Anita Ellis is a 51 y.o. male who presents today for hismedical conditions/complaints as noted below. Anita Ellis is c/o of Ear Fullness    Chief Complaint   Patient presents with    Ear Fullness     HPI:     Otalgia    There is pain in the right ear. This is a new problem. The current episode started 1 to 4 weeks ago. The problem occurs constantly. The problem has been gradually worsening. There has been no fever. The pain is at a severity of 5/10. The pain is moderate. Associated symptoms include ear discharge, headaches, hearing loss and rhinorrhea. Pertinent negatives include no coughing, diarrhea, neck pain, rash, sore throat or vomiting. Associated symptoms comments: popping, cracking, drainage and assoc decreased hearing. He has tried ear drops for the symptoms. The treatment provided no relief. His past medical history is significant for a tympanostomy tube. Subjective:     Review of Systems   Constitutional: Negative for fever. HENT: Positive for ear discharge, ear pain, hearing loss, postnasal drip and rhinorrhea. Negative for congestion, sinus pressure, sinus pain, sore throat and voice change. Respiratory: Negative for cough, chest tightness, shortness of breath and wheezing. Cardiovascular: Negative for chest pain and palpitations. Gastrointestinal: Negative for constipation, diarrhea, nausea and vomiting. Genitourinary: Negative for dysuria and urgency. Musculoskeletal: Negative for neck pain. Skin: Negative for rash. Neurological: Positive for headaches. Negative for dizziness, weakness and light-headedness. Hematological: Positive for adenopathy.      Objective:     Vitals:    05/20/20 0916   BP: 120/80   Site: Right Upper Arm   Position: Sitting   Cuff Size: Medium Adult   Pulse: 90   Temp: 97.7 °F (36.5 °C)   TempSrc: Oral   SpO2: 98%

## 2020-07-13 NOTE — PROGRESS NOTES
follow-up with Toribio Duane, DO.   - Retest hearing as medically indicated and/or sooner if a change in hearing is noted. - Utilize \"Good Communication Strategies\" as discussed to assist in speech understanding with communication partners. - Maintain a sound enriched environment to assist in the management of tinnitus symptoms.    - Use hearing protection devices (HPDs), such as protective ear muffs and ear plugs, when exposed to dangerous sound levels. - Briefly discussed considerations for amplification. Pending medical clearance, if desired, he may schedule a Hearing Aid Evaluation (HAE) appointment do discuss amplification options. [facility] [place], [place]  Audiologist      Chart CC'd to:  Toribio Duane, DO      Degree of   Hearing Sensitivity dB Range   Within Normal Limits (WNL) 0 - 20   Mild 20 - 40   Moderate 40 - 55   Moderately-Severe 55 - 70   Severe 70 - 90   Profound 90 +

## 2020-07-17 ENCOUNTER — OFFICE VISIT (OUTPATIENT)
Dept: ENT CLINIC | Age: 50
End: 2020-07-17
Payer: COMMERCIAL

## 2020-07-17 ENCOUNTER — PROCEDURE VISIT (OUTPATIENT)
Dept: AUDIOLOGY | Age: 50
End: 2020-07-17
Payer: COMMERCIAL

## 2020-07-17 VITALS
HEIGHT: 72 IN | BODY MASS INDEX: 23.43 KG/M2 | DIASTOLIC BLOOD PRESSURE: 82 MMHG | SYSTOLIC BLOOD PRESSURE: 114 MMHG | TEMPERATURE: 98.2 F | HEART RATE: 79 BPM | WEIGHT: 173 LBS

## 2020-07-17 PROCEDURE — 92567 TYMPANOMETRY: CPT | Performed by: AUDIOLOGIST

## 2020-07-17 PROCEDURE — 92504 EAR MICROSCOPY EXAMINATION: CPT | Performed by: OTOLARYNGOLOGY

## 2020-07-17 PROCEDURE — 99204 OFFICE O/P NEW MOD 45 MIN: CPT | Performed by: OTOLARYNGOLOGY

## 2020-07-17 PROCEDURE — 92557 COMPREHENSIVE HEARING TEST: CPT | Performed by: AUDIOLOGIST

## 2020-07-17 ASSESSMENT — ENCOUNTER SYMPTOMS
EYE DISCHARGE: 0
SHORTNESS OF BREATH: 0
EYE ITCHING: 0
VOMITING: 0
CHOKING: 0
FACIAL SWELLING: 0
BLOOD IN STOOL: 0
STRIDOR: 0
NAUSEA: 0
WHEEZING: 0
COLOR CHANGE: 0
VOICE CHANGE: 0
PHOTOPHOBIA: 0
SINUS PAIN: 0
RHINORRHEA: 0
TROUBLE SWALLOWING: 0
DIARRHEA: 0
CONSTIPATION: 0
SINUS PRESSURE: 0
BACK PAIN: 0
COUGH: 0
SORE THROAT: 0

## 2020-07-17 NOTE — PROGRESS NOTES
[facility]  [facility] [address] [place], [address]  P: [phone]  F: [phone]       Patient     Magdalena Sharpe  1970    ChiefComplaint     Chief Complaint   Patient presents with    Ear Problem     Patient is here today because he has some pain in his right ear along with a PE tube that has been in his ear for 15 years, he has a little drainage from his ear but he also has drainage from his nose, his hearing has decreased as well and he has had PE tube in and out of his ear his whole life       History of Present Illness     Magdalena Sharpe is a pleasant 51 y.o. male who presents as a new patient today for right-sided diminished hearing, otalgia, itching. Patient has a history of multiple ear infections with multiple tube placements. Last PE tube placed was approximately 15 years ago. Recent visit to PCP reported that patient still had right tube in the ear. He did have a mild amount of otorrhea 60 weeks ago. Symptoms overall been going on for the past few months. His diminished hearing has been for many years in the right side. Denies any history of ear surgeries other than PE tube placement. Denies any spinning sensation. Admits to tinnitus.     Past Medical History     Past Medical History:   Diagnosis Date    MRSA (methicillin resistant staph aureus) culture positive 9/9/11    Finger positive for MRSA    Recurrent otitis media of both ears     Smoking history        Past Surgical History     Past Surgical History:   Procedure Laterality Date    ORTHOPEDIC SURGERY      L elbow    TYMPANOSTOMY TUBE PLACEMENT      15 tubes in right, 16 tubes in left over the years       Family History     Family History   Problem Relation Age of Onset    Heart Attack Father         5 MI; ~40 at first [facility] High Cholesterol Father     Asthma Father     Diabetes Father     Hypertension Father     No Known Problems Brother     Mult Sclerosis Maternal Grandfather        Social History     Social History     Socioeconomic History    Marital status: Legally      Spouse name: Not on file    Number of children: Not on file    Years of education: Not on file    Highest education level: Not on file   Occupational History    Not on file   Social Needs    Financial resource strain: Not on file    Food insecurity     Worry: Not on file     Inability: Not on file    Transportation needs     Medical: Not on file     Non-medical: Not on file   Tobacco Use    Smoking status: Current Every Day Smoker     Packs/day: 1.00     Types: Cigarettes    Smokeless tobacco: Former User     Types: Chew   Substance and Sexual Activity    Alcohol use: Yes     Comment: only occasionally    Drug use: No    Sexual activity: Yes     Partners: Female   Lifestyle    Physical activity     Days per week: Not on file     Minutes per session: Not on file    Stress: Not on file   Relationships    Social connections     Talks on phone: Not on file     Gets together: Not on file     Attends Oriental orthodox service: Not on file     Active member of club or organization: Not on file     Attends meetings of clubs or organizations: Not on file     Relationship status: Not on file    Intimate partner violence     Fear of current or ex partner: Not on file     Emotionally abused: Not on file     Physically abused: Not on file     Forced sexual activity: Not on file   Other Topics Concern    Not on file   Social History Narrative    Not on file       Allergies     Allergies   Allergen Reactions    Pcn [Penicillins]      \"I was told that I was deathly allergic\"      Tolerates amoxicillin Benny Rocha, APRN-CNP)       Medications     Current Outpatient Medications   Medication Sig Dispense Refill    neomycin-polymyxin-hydrocortisone 1 % SOLN otic solution        No current facility-administered medications for this visit.         Review of Systems     Review of Systems   Constitutional: Negative for activity change, appetite change, chills, diaphoresis, fatigue, fever and unexpected weight change. HENT: Positive for ear pain, hearing loss and tinnitus. Negative for congestion, dental problem, drooling, ear discharge, facial swelling, mouth sores, nosebleeds, postnasal drip, rhinorrhea, sinus pressure, sinus pain, sneezing, sore throat, trouble swallowing and voice change. Eyes: Negative for photophobia, discharge, itching and visual disturbance. Respiratory: Negative for cough, choking, shortness of breath, wheezing and stridor. Gastrointestinal: Negative for blood in stool, constipation, diarrhea, nausea and vomiting. Endocrine: Negative for cold intolerance, heat intolerance, polyphagia and polyuria. Musculoskeletal: Negative for back pain, gait problem, neck pain and neck stiffness. Skin: Negative for color change, pallor, rash and wound. Neurological: Negative for dizziness, syncope, facial asymmetry, speech difficulty, light-headedness, numbness and headaches. Hematological: Negative for adenopathy. Does not bruise/bleed easily. Psychiatric/Behavioral: Negative for agitation, confusion and sleep disturbance. PhysicalExam     Vitals:    07/17/20 1420   BP: 114/82   Pulse: 79   Temp: 98.2 °F (36.8 °C)       Physical Exam  Constitutional:       Appearance: He is well-developed. HENT:      Head: Normocephalic and atraumatic. Not macrocephalic and not microcephalic. No raccoon eyes, Drake's sign, abrasion, contusion, right periorbital erythema, left periorbital erythema or laceration. Hair is normal.      Jaw: No trismus. Right Ear: Hearing, tympanic membrane and external ear normal. No decreased hearing noted. No drainage, swelling or tenderness. No middle ear effusion. No mastoid tenderness. Tympanic membrane is not perforated, retracted or bulging. Tympanic membrane has normal mobility. Left Ear: Hearing, tympanic membrane and external ear normal. No decreased hearing noted. cervical adenopathy. Left cervical: No superficial, deep or posterior cervical adenopathy. Skin:     General: Skin is warm and dry. Findings: No bruising, erythema, laceration, lesion or rash. Neurological:      Mental Status: He is alert and oriented to person, place, and time. Cranial Nerves: No cranial nerve deficit. Psychiatric:         Speech: Speech normal.         Behavior: Behavior normal.           Procedure     Otomicroscopy    An operating microscope was utilized to visualize the external auditory canals using a 4mm speculum. Right external auditory canal with PE tube abutting the right tympanic membrane. Appears to be extruded and attached to the tympanic membrane. This is removed with alligator forcep. Evidence of inflammatory epithelium on lateral aspect of tympanic membrane where tube was located. No otorrhea. No obvious middle ear effusion or perforation. Assessment and Plan     1. Tinnitus of right ear  Audiogram reveals bilateral mild to moderate severe sensorineural hearing loss in the left and bilateral mild to profound downward sloping sensorineural hearing loss in the right. Given the asymmetry, I recommend obtaining an MRI. Word recognition scores are excellent bilaterally. He is likely a good candidate for hearing amplification. Will obtain MRI to rule out retrocochlear pathology. Afterwards we will call him with results. Will dictate further management at that time. - [id number] [facility] [address], [place], [place]. DAVID, [facility]    2. Right ear pain  Patient with right PE tube retained in some inflammatory tissue in the lateral aspect of the tympanic membrane. This was removed in the office today. Patient reports improvement of pain and itching. No evidence of infection currently. Will assess at follow-up if further intervention is necessary.  - [id number] [facility],[address], [place], [place], [facility]    3.  Asymmetrical hearing loss of right ear    - MRI inner auditory canals / posterior fossa with contrast; Future      Return for after MRI. Portions of this note were dictated using Dragon.  There may be linguistic errors secondary to the use of this program.

## 2020-07-17 NOTE — Clinical Note
Dr. Gin Morrow,    Please see note from this patient's audiogram from today. Please let me know if there is anything further you need.       Consuelo Arechiga [address] [facility]  Audiologist

## 2020-07-17 NOTE — PATIENT INSTRUCTIONS
directly into a persons ear      Some additional items that may be helpful:   - Remain patient - this is important for both parties   - Write down items that still cannot be heard/understood. You may write with pen/paper or consider typing/texting on a cell phone or smart device. - If background noise is unavoidable, try to keep yourself in a good position in the room. By sitting at a carter on the side of the restaurant (preferably a corner), it will be easier to communicate than if you were sitting at a table in the middle with background noise surrounding you. Keep yourself positioned away from music speakers or heavy foot traffic.   - If you have difficulty with the television, consider these options:      -- Use closed-captioning, which is a setting you can turn on that displays the spoken words in a written form on the screen. There may be a slight delay, but this can help fill in missing information. This can be especially helpful when watching programs with accented speech. -- Consider use of a sound bar or speakers that come from the front of the TV. With modern flat screen TVs, many of them have speakers that come out of the back of the device, which makes sound bounce off the wall behind it, then go into the room. Sound bars can allow the sound to go straight in your direction and can improve sound quality. -- Consider ear level devices to help improve the volume and/or sound quality of the program.  There are devices that work like headphones that you can adjust the volume for your ears while others can have the volume at a more comfortable level, such as \"TV Ears\". Most hearing aids have devices that allow them to connect directly to the TV and improve sound quality. Tinnitus: Overview and Management Strategies          Many people have some ringing sounds in their ears once in a while. You may hear a roar, a hiss, a tinkle, or a buzz. The sound usually lasts only a few minutes. If it goes on all the time, you may have tinnitus. Tinnitus is usually caused by long-term exposure to loud noise. This damages the nerves in the inner ear. It can occur with all types of hearing loss. It may be a symptom of almost any ear problem. Tinnitus may be caused by a buildup of earwax. Or, it may be caused by ear infections or certain medicines (especially antibiotics or large amounts of aspirin). You can also hear noises in your ears because of an injury to the ears, drinking too much alcohol or caffeine, or a medical condition. Other conditions may also contribute to tinnitus, including: head and neck trauma, temporomandibular joint disorder (TMJ), sinus pressure and barometric trauma, traumatic brain injury, metabolic disorders, autoimmune disorders, stress, and high blood pressure. You may need tests to evaluate your hearing and to find causes of long-lasting tinnitus. Your doctor may suggest one or more treatments to help you cope with the tinnitus. You can also do things at home to help reduce symptoms. Follow-up care is a key part of your treatment and safety. Be sure to make and go to all appointments, and call your doctor if you are having problems. It's also a good idea to know your test results and keep a list of the medicines you take. How can you care for yourself at home? · Limit or cut out alcohol, caffeine, and sodium. They can make your symptoms worse. · Do not smoke or use other tobacco products. Nicotine reduces blood flow to the ear and makes tinnitus worse. If you need help quitting, talk to your doctor about stop-smoking programs and medicines. These can increase your chances of quitting for good. · Talk to your doctor about whether to stop taking aspirin and similar products such as ibuprofen or naproxen. · Get exercise often. It can help improve blood flow to the ear. Ways to manage/cope with tinnitus  Some tinnitus may last a long time.  To manage your tinnitus, try to:  · Avoid noises that you think caused your tinnitus. If you can't avoid loud noises, wear earplugs or earmuffs. · Ignore the sound by paying attention to other things. Keeping your brain busy with other tasks or background noise can help your brain not focus on the tinnitus. · Try to not give the tinnitus an emotional reaction. Do your best to ignore the sound and not let it bother you. Relax using biofeedback, meditation, or yoga. Feeling stressed and being tired can make tinnitus worse. · Play music or white noise to help you sleep. Background noise may cover up the noise that you hear in your ears. You can buy a tabletop machine or a device that sits under your pillow to play soothing sounds, like ocean waves. · Smart phones have free apps, such as Whist, Relax Melodies, ReSound Relief, and White Noise Lite. These apps have different types of sounds/noise, some of which you can blend together to find sounds that are most soothing to you. · Hearing aid technology, especially when there is some hearing loss, may help reduce tinnitus symptoms by giving your brain better access to the sounds it is missing. There are some hearing aids with built-in noise generator programs, which may help when amplification alone is not enough. Additional resources may be found through the [employer]inn[employer] at www.toñito.org    When should you call for help? Call 911 anytime you think you may need emergency care. For example, call if:    · You have symptoms of a stroke. These may include:  ? Sudden numbness, tingling, weakness, or loss of movement in your face, arm, or leg, especially on only one side of your body. ? Sudden vision changes. ? Sudden trouble speaking. ? Sudden confusion or trouble understanding simple statements. ? Sudden problems with walking or balance. ? A sudden, severe headache that is different from past headaches.     Call your doctor now or seek immediate medical care if:    · You develop other symptoms. These may include hearing loss (or worse hearing loss), balance problems, dizziness, nausea, or vomiting. Watch closely for changes in your health, and be sure to contact your doctor if:    · Your tinnitus moves from both ears to one ear. · Your hearing loss gets worse within 1 day after an ear injury. · Your tinnitus or hearing loss does not get better within 1 week after an ear injury. · Your tinnitus bothers you enough that you want to take medicines to help you cope with it. If you notice changes in your tinnitus and/or your hearing, it is recommended that you have your hearing tested by your audiologist and to follow-up with your physician that manages your hearing loss (such as your ENT or Primary Care doctor). Hearing Loss: Care Instructions  Your Care Instructions      Hearing loss is a sudden or slow decrease in how well you hear. It can range from mild to profound. Permanent hearing loss can occur with aging, and it can happen when you are exposed long-term to loud noise. Examples include listening to loud music, riding motorcycles, or being around other loud machines. Hearing loss can affect your work and home life. It can make you feel lonely or depressed. You may feel that you have lost your independence. But hearing aids and other devices can help you hear better and feel connected to others. Follow-up care is a key part of your treatment and safety. Be sure to make and go to all appointments, and call your doctor if you are having problems. It's also a good idea to know your test results and keep a list of the medicines you take. How can you care for yourself at home? · Avoid loud noises whenever possible. This helps keep your hearing from getting worse. Always wear hearing protection around loud noises. · If appropriate, wear hearing aid(s) as directed.   It is recommended that hearing aids are worn during all waking hours to keep your brain active and give it access to the sounds it is missing. · If you are beginning your process with hearing aid(s), schedule a \"Hearing Aid Evaluation\" with an audiologist to discuss your lifestyle, features of hearing aid technology, and styles of hearing aids available. It is recommended that you contact your insurance company to determine if you have a hearing aid benefit, as this may dictate who you can see for these services. · Have hearing tests as your doctor suggests. They can show whether your hearing has changed. Your hearing aid may need to be adjusted. · Use other assistive devices as needed. These may include:  ? Telephone amplifiers and hearing aids that can connect to a television, stereo, radio, or microphone. ? Devices that use lights or vibrations. These alert you to the doorbell, a ringing telephone, or a baby monitor. ? Television closed-captioning. This shows the words at the bottom of the screen. Most new TVs can do this. ? TTY (text telephone). This lets you type messages back and forth on the telephone instead of talking or listening. These devices are also called TDD. When messages are typed on the keyboard, they are sent over the phone line to a receiving TTY. The message is shown on a monitor. · Use pagers, fax machines, text, and email if it is hard for you to communicate by telephone. · Try to learn a listening technique called speech-reading. It is not lip-reading. You pay attention to people's gestures, expressions, posture, and tone of voice. These clues can help you understand what a person is saying. Face the person you are talking to, and have him or her face you. Make sure the lighting is good. You need to see the other person's face clearly. · Think about counseling if you need help to adjust to your hearing loss. When should you call for help?   Watch closely for changes in your health, and be sure to contact your doctor if:    · You think your hearing is getting worse. · You have new symptoms, such as dizziness or nausea. Noise-Induced Hearing Loss  What it is, and what you can do to prevent it    Exposure to loud sounds, in an occupational setting or recreational, can cause permanent hearing loss. Sound is measured in decibels (dB). Noise-induced hearing loss is the ONLY type of preventable hearing loss. Hearing loss related to noise exposure can occur at any age. There are small sensory cells, called inner and outer hair cells, within the inner ear (cochlea). These cells process the loudness (intensity) and pitch (frequency) of sound and send the signal to the brain via our auditory nerve (vestibulocochlear nerve, cranial nerve VIII). When these cells are damaged, they can result in permanent hearing loss and/or tinnitus. The hair cells responsible for high frequency sounds, like birds chirping, are most likely to be damaged due to loud sounds. The high frequency sounds are also very important for our clarity and understanding of speech. OCCUPATIONAL NOISE EXPOSURE RECREATIONAL NOISE EXPOSURE   Some jobs may have exposure to loud sounds in the workplace. These jobs may include but are not limited to:  [employer]   Welding   Landscaping   Hairdressing/hairstyling   [employer]  [employer]    ... And more! Many activities outside of work may cause permanent hearing loss. These activities may include but are not limited to:  Lawnmowers, leaf blowers  [employer] (such as pigs squealing)   Chainsaws and other power tools  [employer] musical instruments and/or singing   Listening to music too loudly - at concerts, through stereo, through ear buds or headphones   Attending sporting events   Attending fireworks shows or using fireworks at home  [employer] of firearms   . .. And more!        REDUCE OR PROTECT YOUR EARS FROM NOISE EXPOSURE    To do your best to avoid the source of the sound. - Sound decreases in intensity as we move further from the source. The sound will decrease by 6 dB for every doubling of distance from the sound source. TYPES OF HEARING PROTECTION    The most common types of hearing protection are protective ear muffs and ear plugs. Protective ear muffs are commonly found at home improvement or sporting good stores, they can be worn time and time again and are great if you need to take your hearing protection off frequently. Ear plugs are often made of foam or soft silicone. The foam ones are designed for one-time use, while silicone ear plugs may be used multiple times. There are also \"filtered\" ear plugs that help provide even attenuation of the sound across all frequencies. These are great for listening to music or going to concerts, and allow for better understanding of speech in louder environments. They can be purchased at music stores or online retailers (search \"Ety Plugs\" or \"filtered ear plugs\"), or custom earmolds can be made with an audiologist.    There are \"custom\" hearing protection devices that you can further discuss with your audiologist based on your specific needs, if desired. Exposure to these sounds may cause permanent damage to your hearing.   If you suspect your hearing has changed, it is recommended that you have your hearing tested by your audiologist.

## 2022-01-26 ENCOUNTER — TELEPHONE (OUTPATIENT)
Dept: INTERNAL MEDICINE CLINIC | Age: 52
End: 2022-01-26

## 2022-01-26 NOTE — TELEPHONE ENCOUNTER
Please notify patient that they are due for well adult check to catch up on screenings/labs/chronic conditions.  Will need colon cancer screening

## 2022-04-11 ENCOUNTER — HOSPITAL ENCOUNTER (EMERGENCY)
Age: 52
Discharge: HOME | End: 2022-04-11
Payer: COMMERCIAL

## 2022-04-11 VITALS
TEMPERATURE: 97.8 F | SYSTOLIC BLOOD PRESSURE: 125 MMHG | HEART RATE: 75 BPM | RESPIRATION RATE: 16 BRPM | DIASTOLIC BLOOD PRESSURE: 85 MMHG | OXYGEN SATURATION: 97 %

## 2022-04-11 DIAGNOSIS — H60.393 INFECTIVE OTITIS EXTERNA OF BOTH EARS: Primary | ICD-10-CM

## 2022-04-11 DIAGNOSIS — R09.81 NASAL CONGESTION: ICD-10-CM

## 2022-04-11 DIAGNOSIS — M25.511 CHRONIC RIGHT SHOULDER PAIN: ICD-10-CM

## 2022-04-11 DIAGNOSIS — G89.29 CHRONIC RIGHT SHOULDER PAIN: ICD-10-CM

## 2022-04-11 PROCEDURE — 99283 EMERGENCY DEPT VISIT LOW MDM: CPT

## 2022-04-11 RX ORDER — ACETAMINOPHEN 500 MG
1000 TABLET ORAL 3 TIMES DAILY PRN
Qty: 60 TABLET | Refills: 0 | Status: SHIPPED | OUTPATIENT
Start: 2022-04-11

## 2022-04-11 RX ORDER — METHOCARBAMOL 750 MG/1
750 TABLET, FILM COATED ORAL 3 TIMES DAILY PRN
Qty: 30 TABLET | Refills: 0 | Status: SHIPPED | OUTPATIENT
Start: 2022-04-11 | End: 2022-04-21

## 2022-04-11 RX ORDER — IBUPROFEN 800 MG/1
800 TABLET ORAL EVERY 8 HOURS PRN
Qty: 20 TABLET | Refills: 0 | Status: SHIPPED | OUTPATIENT
Start: 2022-04-11

## 2022-04-11 RX ORDER — CLINDAMYCIN HYDROCHLORIDE 300 MG/1
300 CAPSULE ORAL 3 TIMES DAILY
Qty: 21 CAPSULE | Refills: 0 | Status: SHIPPED | OUTPATIENT
Start: 2022-04-11 | End: 2022-04-18

## 2022-04-11 ASSESSMENT — ENCOUNTER SYMPTOMS
DIARRHEA: 0
SHORTNESS OF BREATH: 0
WHEEZING: 0
CHEST TIGHTNESS: 0
VOICE CHANGE: 0
RHINORRHEA: 1
ABDOMINAL PAIN: 0
RESPIRATORY NEGATIVE: 1
SINUS PRESSURE: 1
BACK PAIN: 0
SORE THROAT: 0
NAUSEA: 0
EYE PAIN: 0
TROUBLE SWALLOWING: 0
GASTROINTESTINAL NEGATIVE: 1
VOMITING: 0
COUGH: 0
PHOTOPHOBIA: 0

## 2022-04-11 ASSESSMENT — PAIN DESCRIPTION - DESCRIPTORS: DESCRIPTORS: THROBBING

## 2022-04-11 ASSESSMENT — PAIN DESCRIPTION - ORIENTATION: ORIENTATION: RIGHT;LEFT

## 2022-04-11 ASSESSMENT — PAIN DESCRIPTION - LOCATION: LOCATION: SHOULDER;EAR

## 2022-04-11 ASSESSMENT — PAIN DESCRIPTION - PAIN TYPE: TYPE: ACUTE PAIN

## 2022-04-11 ASSESSMENT — PAIN SCALES - GENERAL: PAINLEVEL_OUTOF10: 8

## 2022-04-11 NOTE — ED NOTES
Patient discharged in stable, ambulatory condition with all documented belongings. This nurse reviewed discharge instruction, pt verbalized understanding.        Elaine Lara, RN  04/11/22 [id number]

## 2022-12-15 ENCOUNTER — TELEPHONE (OUTPATIENT)
Dept: INTERNAL MEDICINE CLINIC | Age: 52
End: 2022-12-15

## 2022-12-15 NOTE — TELEPHONE ENCOUNTER
Patient overdue for check up/annual exam seeing as it has been >1 year since being seen.  Would suggest scheduling an appointment to update plan of care

## 2022-12-15 NOTE — TELEPHONE ENCOUNTER
Spoke to patient. He is at work. Patient stated he will call office back to schedule a physical with Yovany. Told patient he hasn't been seen since 2020 and needs to make an appointment he is way over due.

## 2022-12-20 NOTE — TELEPHONE ENCOUNTER
I left message on voicemail. Patient overdue for check up/annual exam seeing as it has been >1 year since being seen. Would suggest scheduling an appointment to update plan of care.  2nd call

## 2022-12-21 NOTE — TELEPHONE ENCOUNTER
3rd call  No [facility] to send a messasge to the patient. Left a VM for advising Patient he's overdue for check up/annual exam seeing as it has been >1 year since being seen. Would suggest scheduling an appointment to update plan of care.

## 2023-01-31 ENCOUNTER — HOSPITAL ENCOUNTER (EMERGENCY)
Age: 53
Discharge: HOME | End: 2023-01-31
Payer: COMMERCIAL

## 2023-01-31 VITALS
BODY MASS INDEX: 27.09 KG/M2 | HEIGHT: 72 IN | HEART RATE: 80 BPM | OXYGEN SATURATION: 99 % | DIASTOLIC BLOOD PRESSURE: 83 MMHG | WEIGHT: 200 LBS | TEMPERATURE: 97 F | RESPIRATION RATE: 16 BRPM | SYSTOLIC BLOOD PRESSURE: 126 MMHG

## 2023-01-31 DIAGNOSIS — H10.9 CONJUNCTIVITIS OF LEFT EYE, UNSPECIFIED CONJUNCTIVITIS TYPE: ICD-10-CM

## 2023-01-31 DIAGNOSIS — H00.14 CHALAZION OF LEFT UPPER EYELID: Primary | ICD-10-CM

## 2023-01-31 PROCEDURE — 99283 EMERGENCY DEPT VISIT LOW MDM: CPT

## 2023-01-31 RX ORDER — TETRACAINE HYDROCHLORIDE 5 MG/ML
1 SOLUTION OPHTHALMIC ONCE
Status: DISCONTINUED | OUTPATIENT
Start: 2023-01-31 | End: 2023-01-31 | Stop reason: HOSPADM

## 2023-01-31 RX ORDER — ERYTHROMYCIN 5 MG/G
OINTMENT OPHTHALMIC
Qty: 1 EACH | Refills: 0 | Status: SHIPPED | OUTPATIENT
Start: 2023-01-31 | End: 2023-02-10

## 2023-01-31 ASSESSMENT — VISUAL ACUITY
OU: 20/40
OS: 20/50
OD: 20/25

## 2023-01-31 ASSESSMENT — PAIN - FUNCTIONAL ASSESSMENT
PAIN_FUNCTIONAL_ASSESSMENT: NONE - DENIES PAIN
PAIN_FUNCTIONAL_ASSESSMENT: NONE - DENIES PAIN

## 2023-01-31 NOTE — DISCHARGE INSTRUCTIONS
Use the erythromycin eye ointment 4 times a day while awake, please call tomorrow to schedule an appointment with [facility] President Roque Horton for further evaluation. Apply warm moist compresses as we discussed with left eye several times throughout the day to facilitate drainage.

## 2023-01-31 NOTE — Clinical Note
Siobhan Banks was seen and treated in our emergency department on 1/31/2023. He may return to work on 02/03/2023. If you have any questions or concerns, please don't hesitate to call.       Ewa Castro, APRN - CNP

## 2023-02-01 ASSESSMENT — ENCOUNTER SYMPTOMS
CONSTIPATION: 0
NAUSEA: 0
EYE ITCHING: 1
RHINORRHEA: 0
SHORTNESS OF BREATH: 0
EYE REDNESS: 1
DIARRHEA: 0
PHOTOPHOBIA: 0
COUGH: 0
SORE THROAT: 0
VOMITING: 0
EYE DISCHARGE: 1
ABDOMINAL PAIN: 0
EYE PAIN: 0

## 2023-02-01 ASSESSMENT — TONOMETRY
IOP_HANDHELD: 1
OS_IOP_MMHG: 19

## 2023-02-01 ASSESSMENT — VISUAL ACUITY: OU: 1

## 2023-02-01 NOTE — ED PROVIDER NOTES
[address] ED  EMERGENCY DEPARTMENT ENCOUNTER        Pt Name: Torres Gr  MRN: [id number]  [place] 1970  Date of evaluation: 1/31/2023  Provider: GEORGE Avendano CNP  PCP: GEORGE Garcia CNP    This patient was not seen and evaluated by the attending physician     I have evaluated this patient. My supervising physician was available for consultation. CHIEF COMPLAINT       Chief Complaint   Patient presents with    Eye Problem     Pt c/o left eye drainage, irritation, pain and redness for 1 week       HISTORY OF PRESENT ILLNESS   (Location/Symptom, Timing/Onset, Context/Setting, Quality, Duration, Modifying Factors, Severity)  Note limiting factors. Torres Gr is a 47 y.o. male who presents via private car for left eye irritation. Onset was 1 week ago. Duration has been since the onset. Context includes patient presents to the emergency department today with complaints of left eye redness and \"itching\" for the last week. He states that he has noticed an area of swelling that is begun to develop to his left upper outer eyelid, it is nontender and he denies pain with eye movement. He denies any changes to his visual fields but states \"it feels like there is sandpaper in my eye\". He denies photophobia. He states this morning when he woke up he noticed a lot of drainage and matting to the inner canthus of his eye which is what prompted him to come to the emergency department. He denies any headaches, changes to his vision such as double vision or blurry vision. He does not wear contacts or corrective lenses. He denies any recent illness including fevers. He denies any pressure to his eye or other complaints. Quality is itching and burning with radiation to his left eye. Alleviating factors include nothing. Aggravating factors include nothing. Pain is */10. Nothing has been used for pain today.        Chart review reveals pt has significant PMHx of no significant past medical history. They take no medications. Nursing Notes were all reviewed and agreed with or any disagreements were addressed  in the HPI. Pt was seen during the [place] 19 pandemic. Appropriate PPE worn by ME during patient encounters. Pt seen during a time with constrained hospital bed capacity and other potential inpatient and outpatient resources were constrained due to the viral pandemic. REVIEW OF SYSTEMS    (2-9 systems for level 4, 10 or more for level 5)     Review of Systems   Constitutional:  Negative for chills, diaphoresis and fever. HENT:  Negative for congestion, rhinorrhea and sore throat. Eyes:  Positive for discharge, redness and itching. Negative for photophobia, pain and visual disturbance. Respiratory:  Negative for cough and shortness of breath. Cardiovascular:  Negative for chest pain and leg swelling. Gastrointestinal:  Negative for abdominal pain, constipation, diarrhea, nausea and vomiting. Musculoskeletal:  Negative for neck pain and neck stiffness. Skin:  Negative for rash and wound. Neurological:  Negative for light-headedness and headaches. Positives and Pertinent negatives as per HPI. Except as noted abovein the ROS, all other systems were reviewed and negative.        PAST MEDICAL HISTORY     Past Medical History:   Diagnosis Date    MRSA (methicillin resistant staph aureus) culture positive 9/9/11    Finger positive for MRSA    Recurrent otitis media of both ears     Smoking history          SURGICAL HISTORY     Past Surgical History:   Procedure Laterality Date    ORTHOPEDIC SURGERY      L elbow    TYMPANOSTOMY TUBE PLACEMENT      15 tubes in right, 16 tubes in left over the years         [address]       Discharge Medication List as of 1/31/2023  6:52 PM            ALLERGIES     Pcn [penicillins]    FAMILYHISTORY       Family History   Problem Relation Age of Onset    Heart Attack Father         5 MI; ~40 at first MI    High Cholesterol Father     Asthma Father     Diabetes Father     Hypertension Father     No Known Problems Brother     Mult Sclerosis Maternal Grandfather           SOCIAL HISTORY       Social History     Socioeconomic History    Marital status: Legally      Spouse name: None    Number of children: None    Years of education: None    Highest education level: None   Tobacco Use    Smoking status: Every Day     Packs/day: 1.00     Types: Cigarettes    Smokeless tobacco: Former     Types: Chew   Vaping Use    Vaping Use: Never used   Substance and Sexual Activity    Alcohol use: Yes     Comment: only occasionally    Drug use: No    Sexual activity: Yes     Partners: Female       SCREENINGS    [place] Coma Scale  Eye Opening: Spontaneous  Best Verbal Response: Oriented  Best Motor Response: Obeys commands  [place] Coma Scale Score: 15        PHYSICAL EXAM    (up to 7 for level 4, 8 or more for level 5)     ED Triage Vitals [01/31/23 1553]   BP Temp Temp Source Heart Rate Resp SpO2 Height Weight   126/83 97 °F (36.1 °C) Oral 80 16 99 % 6' (1.829 m) 200 lb (90.7 kg)       Physical Exam  Vitals and nursing note reviewed. Constitutional:       Appearance: Normal appearance. He is not ill-appearing or diaphoretic. HENT:      Head: Normocephalic and atraumatic. Right Ear: External ear normal.      Left Ear: External ear normal.      Mouth/Throat:      Mouth: Mucous membranes are moist.      Pharynx: Oropharynx is clear. Eyes:      General: Lids are everted, no foreign bodies appreciated. Vision grossly intact. Gaze aligned appropriately. No allergic shiner, visual field deficit or scleral icterus. Right eye: No foreign body, discharge or hordeolum. Left eye: Hordeolum present. No foreign body or discharge. Intraocular pressure: Left eye pressure is 19 mmHg. Measurements were taken using a handheld tonometer. Extraocular Movements: Extraocular movements intact.       Right eye: Normal extraocular motion and no nystagmus. Left eye: Normal extraocular motion and no nystagmus. Conjunctiva/sclera:      Left eye: Left conjunctiva is injected. Exudate present. No chemosis or hemorrhage. Pupils: Pupils are equal, round, and reactive to light. Right eye: Pupil is round, reactive and not sluggish. No corneal abrasion or fluorescein uptake. Lio exam negative. Left eye: Pupil is round, reactive and not sluggish. No corneal abrasion or fluorescein uptake. Lio exam negative. Pulmonary:      Effort: Pulmonary effort is normal. No respiratory distress. Musculoskeletal:         General: Normal range of motion. Cervical back: Normal range of motion and neck supple. Skin:     General: Skin is warm and dry. Neurological:      General: No focal deficit present. Mental Status: He is alert and oriented to person, place, and time. GCS: GCS eye subscore is 4. GCS verbal subscore is 5. GCS motor subscore is 6. Psychiatric:         Mood and Affect: Mood normal.         Behavior: Behavior normal.     PHYSICAL EXAM  /83   Pulse 80   Temp 97 °F (36.1 °C) (Oral)   Resp 16   Ht 6' (1.829 m)   Wt 200 lb (90.7 kg)   SpO2 99%   BMI 27.12 kg/m²       DIAGNOSTIC RESULTS   LABS:    Labs Reviewed - No data to display    All other labs were within normal range or not returned as of this dictation. EKG: All EKG's are interpreted by the Emergency Department Physician who either signs orCo-signs this chart in the absence of a cardiologist.  Please see their note for interpretation of EKG. RADIOLOGY:   Non-plain film images such as CT, Ultrasound and MRI are read by the radiologist. Plain radiographic images are visualized andpreliminarily interpreted by the  ED Provider with the below findings:        Interpretation [facility] Radiologist below, if available at the time of this note:    No orders to display     No results found.        PROCEDURES   Unless otherwise noted below, none     Procedures    CRITICAL CARE TIME   N/A    CONSULTS:  None      EMERGENCY DEPARTMENT COURSE and DIFFERENTIALDIAGNOSIS/MDM:   Vitals:    Vitals:    01/31/23 1553   BP: 126/83   Pulse: 80   Resp: 16   Temp: 97 °F (36.1 °C)   TempSrc: Oral   SpO2: 99%   Weight: 200 lb (90.7 kg)   Height: 6' (1.829 m)       Patient was given thefollowing medications:  Medications - No data to display    PDMP Monitoring:    Last PDMP Martha Alberto as Reviewed [facility]):  Review User Review Instant Review Result          Last Controlled Substance Monitoring Documentation      [address] [facility] ED from 5/12/2017 in [facility] ED   Attestation The Prescription Monitoring Report for this patient was reviewed today. filed at 05/12/2017 4532   Periodic Controlled Substance Monitoring No signs of potential drug abuse or diversion identified. filed at 05/12/2017 3845          Urine Drug Screenings (1 yr)    No resulted procedures found. Medication Contract and Consent for Opioid Use Documents Filed        No documents found                    MDM:   Patient was seen and evaluated by myself. He presents to the emergency department today with complaints of some itching and burning to the left eye along with a nontender rubbery nodule that he has noted to the outer upper eyelid of his left eye for about the last week. On exam he is alert and oriented, hemodynamically stable and nontoxic in appearance. Eye exam completed, visual acuity was obtained prior to instillation of numbing drops. He denies any decreases in vision. Bilateral vision 20/40, right eye 20/25, left eye 20/50. He does have some scant exudate noted to the inner canthus of the left eye, there is no surrounding erythema or tenderness to palpation of the periorbital or preseptal region. There is no surrounding erythema to the rubbery nontender nodule. It does appear to be a chalazion.   I discussed with him treatment of this at home including warm moist compresses. Because of the exudate noted in the corner of the eye along with the injected appearance I did discuss with him covering him for conjunctivitis with erythromycin eye ointment and he was agreeable to this. He denies any pain with eye movement at any time. Intraocular pressures were obtained and normal.  I discussed with him a plan for discharge including following up with [facility], he was provided with referral contact information. He was instructed on use of the erythromycin 4 times a day while awake. I instructed him to apply warm moist compresses for this chalazion several times throughout the day to facilitate drainage. He stated he would call [facility] tomorrow to schedule an appointment. Was provided with strict return precautions for the emergency department including but not limited to pain with eye movement, headaches, changes to his visual fields, sudden or significant onset of eye pain, pressure or other concerns. He verbalized understanding of all discharge teaching and was ultimately discharged in a stable condition with all questions answered. Is this patient to be included in the SEP-1 Core Measure due to severe sepsis or septic shock? No   Exclusion criteria - the patient is NOT to be included for SEP-1 Core Measure due to:  2+ SIRS criteria are not met    Discharge Time out:  CC Reviewed Yes   Test Results Yes     Vitals:    01/31/23 1553   BP: 126/83   Pulse: 80   Resp: 16   Temp: 97 °F (36.1 °C)   SpO2: 99%              FINAL IMPRESSION      1. Chalazion of left upper eyelid    2.  Conjunctivitis of left eye, unspecified conjunctivitis type          DISPOSITION/PLAN   DISPOSITION Decision To Discharge 01/31/2023 06:38:48 PM      PATIENT REFERREDTO:  GEORGE Arreola - CNP  [address]  [phone]    As needed, If symptoms [facility] [address] [id number]  [phone]          DISCHARGE MEDICATIONS:  Discharge Medication List as of 1/31/2023  6:52 PM        START taking these medications    Details   erythromycin (ROMYCIN) 5 MG/GM ophthalmic ointment Apply 1/2 inch ribbon to left eye 4 times daily while awake, Disp-1 each, R-0, Normal             DISCONTINUED MEDICATIONS:  Discharge Medication List as of 1/31/2023  6:52 PM        STOP taking these medications       acetaminophen (TYLENOL) 500 MG tablet Comments:   Reason for Stopping:         ibuprofen (IBU) 800 MG tablet Comments:   Reason for Stopping:                      (Please note that portions ofthis note were completed with a voice recognition program.  Efforts were made to edit the dictations but occasionally words are mis-transcribed.)    Darryle Cooper, APRN - CNP (electronically signed)       Darryle Cooper, APRN - CNP  02/01/23 7350

## 2023-06-06 ENCOUNTER — OFFICE VISIT (OUTPATIENT)
Dept: INTERNAL MEDICINE CLINIC | Age: 53
End: 2023-06-06
Payer: COMMERCIAL

## 2023-06-06 VITALS
DIASTOLIC BLOOD PRESSURE: 70 MMHG | WEIGHT: 181 LBS | TEMPERATURE: 97 F | SYSTOLIC BLOOD PRESSURE: 110 MMHG | OXYGEN SATURATION: 98 % | HEART RATE: 78 BPM | BODY MASS INDEX: 24.55 KG/M2

## 2023-06-06 DIAGNOSIS — J40 BRONCHITIS: Primary | ICD-10-CM

## 2023-06-06 PROCEDURE — 99213 OFFICE O/P EST LOW 20 MIN: CPT | Performed by: NURSE PRACTITIONER

## 2023-06-06 RX ORDER — GUAIFENESIN 600 MG/1
600 TABLET, EXTENDED RELEASE ORAL 2 TIMES DAILY
Qty: 30 TABLET | Refills: 0 | Status: SHIPPED | OUTPATIENT
Start: 2023-06-06 | End: 2023-06-21

## 2023-06-06 RX ORDER — BENZONATATE 100 MG/1
100-200 CAPSULE ORAL 3 TIMES DAILY PRN
Qty: 60 CAPSULE | Refills: 0 | Status: SHIPPED | OUTPATIENT
Start: 2023-06-06 | End: 2023-06-13

## 2023-06-06 RX ORDER — DOXYCYCLINE HYCLATE 100 MG
100 TABLET ORAL 2 TIMES DAILY
Qty: 14 TABLET | Refills: 0 | Status: SHIPPED | OUTPATIENT
Start: 2023-06-06 | End: 2023-06-13

## 2023-06-06 SDOH — ECONOMIC STABILITY: FOOD INSECURITY: WITHIN THE PAST 12 MONTHS, YOU WORRIED THAT YOUR FOOD WOULD RUN OUT BEFORE YOU GOT MONEY TO BUY MORE.: NEVER TRUE

## 2023-06-06 SDOH — ECONOMIC STABILITY: INCOME INSECURITY: HOW HARD IS IT FOR YOU TO PAY FOR THE VERY BASICS LIKE FOOD, HOUSING, MEDICAL CARE, AND HEATING?: NOT HARD AT ALL

## 2023-06-06 SDOH — ECONOMIC STABILITY: FOOD INSECURITY: WITHIN THE PAST 12 MONTHS, THE FOOD YOU BOUGHT JUST DIDN'T LAST AND YOU DIDN'T HAVE MONEY TO GET MORE.: NEVER TRUE

## 2023-06-06 SDOH — ECONOMIC STABILITY: HOUSING INSECURITY
IN THE LAST 12 MONTHS, WAS THERE A TIME WHEN YOU DID NOT HAVE A STEADY PLACE TO SLEEP OR SLEPT IN A SHELTER (INCLUDING NOW)?: NO

## 2023-06-06 ASSESSMENT — ENCOUNTER SYMPTOMS
SORE THROAT: 0
VOICE CHANGE: 1
RHINORRHEA: 1
CONSTIPATION: 0
COUGH: 1
CHANGE IN BOWEL HABIT: 0
CHEST TIGHTNESS: 0
SINUS PAIN: 0
NAUSEA: 0
SHORTNESS OF BREATH: 0
SWOLLEN GLANDS: 0
SINUS PRESSURE: 1
DIARRHEA: 0
VOMITING: 0
WHEEZING: 1

## 2023-06-06 ASSESSMENT — PATIENT HEALTH QUESTIONNAIRE - PHQ9
1. LITTLE INTEREST OR PLEASURE IN DOING THINGS: 0
2. FEELING DOWN, DEPRESSED OR HOPELESS: 0
SUM OF ALL RESPONSES TO PHQ QUESTIONS 1-9: 0
SUM OF ALL RESPONSES TO PHQ9 QUESTIONS 1 & 2: 0
SUM OF ALL RESPONSES TO PHQ QUESTIONS 1-9: 0

## 2023-06-06 NOTE — PROGRESS NOTES
follow-ups on file. Patientshould call the office immediately with new or ongoing signs or symptoms or worsening, or proceed to the emergency room. If you are on medications which could impair your senses, you are at risk of weakness, falls,dizziness, or drowsiness. You should be careful during activities which could place you at risk of harm, such as climbing, using stairs, operating machinery, or driving vehicles. If you feel you cannot safely do theseactivities, you should request others to help you, or avoid the activities altogether. If you are drowsy for any other reason, you should use the same precautions as listed above. Call if pattern of symptoms change or persists for an extended time.       Jonel Mata

## 2023-07-24 ENCOUNTER — HOSPITAL ENCOUNTER (EMERGENCY)
Age: 53
Discharge: HOME | End: 2023-07-24
Attending: EMERGENCY MEDICINE
Payer: COMMERCIAL

## 2023-07-24 ENCOUNTER — APPOINTMENT (OUTPATIENT)
Dept: GENERAL RADIOLOGY | Age: 53
End: 2023-07-24
Payer: COMMERCIAL

## 2023-07-24 VITALS
HEIGHT: 72 IN | RESPIRATION RATE: 16 BRPM | SYSTOLIC BLOOD PRESSURE: 143 MMHG | OXYGEN SATURATION: 97 % | TEMPERATURE: 97.5 F | DIASTOLIC BLOOD PRESSURE: 91 MMHG | HEART RATE: 69 BPM | BODY MASS INDEX: 25.73 KG/M2 | WEIGHT: 190 LBS
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
## 2023-07-24 DIAGNOSIS — S91.051A DOG BITE OF RIGHT ANKLE, INITIAL ENCOUNTER: Primary | ICD-10-CM

## 2023-07-24 PROCEDURE — 6370000000 HC RX 637 (ALT 250 FOR IP): Performed by: EMERGENCY MEDICINE

## 2023-07-24 PROCEDURE — 99283 EMERGENCY DEPT VISIT LOW MDM: CPT

## 2023-07-24 PROCEDURE — 73610 X-RAY EXAM OF ANKLE: CPT

## 2023-07-24 RX ORDER — IBUPROFEN 600 MG/1
600 TABLET ORAL EVERY 8 HOURS PRN
Qty: 15 TABLET | Refills: 0 | Status: SHIPPED | OUTPATIENT
Start: 2023-07-24 | End: 2023-07-29

## 2023-07-24 RX ORDER — DOXYCYCLINE HYCLATE 100 MG
100 TABLET ORAL 2 TIMES DAILY
Qty: 14 TABLET | Refills: 0 | Status: SHIPPED | OUTPATIENT
Start: 2023-07-24 | End: 2023-07-31

## 2023-07-24 RX ORDER — IBUPROFEN 600 MG/1
600 TABLET ORAL ONCE
Status: COMPLETED | OUTPATIENT
Start: 2023-07-24 | End: 2023-07-24

## 2023-07-24 RX ORDER — DOXYCYCLINE HYCLATE 100 MG
100 TABLET ORAL ONCE
Status: COMPLETED | OUTPATIENT
Start: 2023-07-24 | End: 2023-07-24

## 2023-07-24 RX ORDER — IBUPROFEN 600 MG/1
600 TABLET ORAL EVERY 8 HOURS PRN
Qty: 15 TABLET | Refills: 0 | Status: SHIPPED | OUTPATIENT
Start: 2023-07-24 | End: 2023-07-24 | Stop reason: SDUPTHER

## 2023-07-24 RX ORDER — DOXYCYCLINE HYCLATE 100 MG
100 TABLET ORAL 2 TIMES DAILY
Qty: 14 TABLET | Refills: 0 | Status: SHIPPED | OUTPATIENT
Start: 2023-07-24 | End: 2023-07-24 | Stop reason: SDUPTHER

## 2023-07-24 RX ADMIN — DOXYCYCLINE HYCLATE 100 MG: 100 TABLET, COATED ORAL at 11:02

## 2023-07-24 RX ADMIN — IBUPROFEN 600 MG: 600 TABLET, FILM COATED ORAL at 11:02

## 2023-07-24 ASSESSMENT — PAIN DESCRIPTION - PAIN TYPE: TYPE: ACUTE PAIN

## 2023-07-24 ASSESSMENT — PAIN SCALES - GENERAL: PAINLEVEL_OUTOF10: 5

## 2023-07-24 ASSESSMENT — PAIN DESCRIPTION - FREQUENCY: FREQUENCY: CONTINUOUS

## 2023-07-24 ASSESSMENT — ENCOUNTER SYMPTOMS
SHORTNESS OF BREATH: 0
WHEEZING: 0
TROUBLE SWALLOWING: 0
VOICE CHANGE: 0

## 2023-07-24 ASSESSMENT — PAIN - FUNCTIONAL ASSESSMENT: PAIN_FUNCTIONAL_ASSESSMENT: 0-10

## 2023-07-24 NOTE — ED NOTES
Reviewed patient discharge instructions at this time, copy given to patient. No questions or concerns. Patient voiced understanding.         Bernabe Felix RN  07/24/23 5887

## 2023-07-24 NOTE — ED PROVIDER NOTES
[address] [facility]  eMERGENCY dEPARTMENT eNCOUnter      Pt Name: Josette Navarrete  MRN: [id number]  [address] [facility] 1970  Date of evaluation: 7/24/2023  Provider: Chano Meng MD    CHIEF COMPLAINT       Chief Complaint   Patient presents with    Animal Bite     Patient reports he was drinking last night and accidentally stepped on his ginny. He states he startled the dog and he bit the lower right leg, above the ankle. Patient states it was a quick bite and release. There are a total of 3 puncture wounds. The dog is up to date on vaccines. HISTORY OF PRESENT ILLNESS   (Location/Symptom, Timing/Onset, Context/Setting, Quality, Duration, Modifying Factors, Severity)  Note limiting factors. History obtained from: The patient and his wife    Josette Navarrete is a 47 y.o. male who presents with a dog bite to his right lower leg. Patient reports that he was intoxicated last night and accidentally stepped on his sleeping dog and was bit by the dog. He reports that the dog was not acting rapidly, but it was his fault for stepping on the dog, and that the dog is up-to-date on his rabies vaccinations. Patient also reports he is up-to-date on his own tetanus vaccination. Patient ports pain is moderate constant unchanged. Reports tactile pressure worsens the pain nothing improves it. HPI    Nursing Notes were reviewed. REVIEW OFSYSTEMS    (2-9 systems for level 4, 10 or more for level 5)     Review of Systems   Constitutional:  Negative for fever. HENT:  Negative for drooling, trouble swallowing and voice change. Eyes:  Negative for visual disturbance. Respiratory:  Negative for shortness of breath and wheezing. Cardiovascular:  Negative for chest pain and palpitations. Musculoskeletal:  Positive for arthralgias. Skin:  Positive for wound. Neurological:  Negative for seizures and syncope. Psychiatric/Behavioral:  Negative for self-injury and suicidal ideas.       Except as noted

## 2023-09-12 ENCOUNTER — HOSPITAL ENCOUNTER (EMERGENCY)
Age: 53
Discharge: HOME | End: 2023-09-12
Attending: EMERGENCY MEDICINE
Payer: COMMERCIAL

## 2023-09-12 ENCOUNTER — APPOINTMENT (OUTPATIENT)
Dept: GENERAL RADIOLOGY | Age: 53
End: 2023-09-12
Payer: COMMERCIAL

## 2023-09-12 VITALS
OXYGEN SATURATION: 100 % | DIASTOLIC BLOOD PRESSURE: 88 MMHG | WEIGHT: 170 LBS | RESPIRATION RATE: 14 BRPM | SYSTOLIC BLOOD PRESSURE: 128 MMHG | BODY MASS INDEX: 23.03 KG/M2 | HEART RATE: 68 BPM | TEMPERATURE: 97.6 F | HEIGHT: 72 IN

## 2023-09-12 DIAGNOSIS — M25.511 ACUTE PAIN OF RIGHT SHOULDER: Primary | ICD-10-CM

## 2023-09-12 PROCEDURE — 99283 EMERGENCY DEPT VISIT LOW MDM: CPT

## 2023-09-12 PROCEDURE — 6370000000 HC RX 637 (ALT 250 FOR IP): Performed by: EMERGENCY MEDICINE

## 2023-09-12 PROCEDURE — 73030 X-RAY EXAM OF SHOULDER: CPT

## 2023-09-12 RX ORDER — IBUPROFEN 600 MG/1
600 TABLET ORAL ONCE
Status: COMPLETED | OUTPATIENT
Start: 2023-09-12 | End: 2023-09-12

## 2023-09-12 RX ORDER — IBUPROFEN 600 MG/1
600 TABLET ORAL EVERY 8 HOURS PRN
Qty: 15 TABLET | Refills: 0 | Status: SHIPPED | OUTPATIENT
Start: 2023-09-12 | End: 2023-09-12 | Stop reason: SDUPTHER

## 2023-09-12 RX ORDER — CYCLOBENZAPRINE HCL 10 MG
10 TABLET ORAL 2 TIMES DAILY PRN
Qty: 20 TABLET | Refills: 0 | Status: SHIPPED | OUTPATIENT
Start: 2023-09-12 | End: 2023-09-22

## 2023-09-12 RX ORDER — CYCLOBENZAPRINE HCL 10 MG
10 TABLET ORAL 2 TIMES DAILY PRN
Qty: 20 TABLET | Refills: 0 | Status: SHIPPED | OUTPATIENT
Start: 2023-09-12 | End: 2023-09-12 | Stop reason: SDUPTHER

## 2023-09-12 RX ORDER — IBUPROFEN 600 MG/1
600 TABLET ORAL EVERY 8 HOURS PRN
Qty: 15 TABLET | Refills: 0 | Status: SHIPPED | OUTPATIENT
Start: 2023-09-12 | End: 2023-09-17

## 2023-09-12 RX ADMIN — IBUPROFEN 600 MG: 600 TABLET ORAL at 11:07

## 2023-09-12 ASSESSMENT — PAIN - FUNCTIONAL ASSESSMENT
PAIN_FUNCTIONAL_ASSESSMENT: PREVENTS OR INTERFERES WITH MANY ACTIVE NOT PASSIVE ACTIVITIES
PAIN_FUNCTIONAL_ASSESSMENT: 0-10

## 2023-09-12 ASSESSMENT — ENCOUNTER SYMPTOMS
VOICE CHANGE: 0
WHEEZING: 0
TROUBLE SWALLOWING: 0
SHORTNESS OF BREATH: 0

## 2023-09-12 ASSESSMENT — PAIN DESCRIPTION - ORIENTATION: ORIENTATION: RIGHT

## 2023-09-12 ASSESSMENT — LIFESTYLE VARIABLES
HOW MANY STANDARD DRINKS CONTAINING ALCOHOL DO YOU HAVE ON A TYPICAL DAY: PATIENT DOES NOT DRINK
HOW OFTEN DO YOU HAVE A DRINK CONTAINING ALCOHOL: NEVER

## 2023-09-12 ASSESSMENT — PAIN DESCRIPTION - LOCATION: LOCATION: SHOULDER

## 2023-09-12 ASSESSMENT — PAIN DESCRIPTION - FREQUENCY: FREQUENCY: CONTINUOUS

## 2023-09-12 ASSESSMENT — PAIN SCALES - GENERAL: PAINLEVEL_OUTOF10: 10

## 2023-09-12 ASSESSMENT — PAIN DESCRIPTION - PAIN TYPE: TYPE: ACUTE PAIN

## 2023-09-12 ASSESSMENT — PAIN DESCRIPTION - ONSET: ONSET: ON-GOING

## 2023-09-12 ASSESSMENT — PAIN DESCRIPTION - DESCRIPTORS: DESCRIPTORS: BURNING;SHARP

## 2023-09-12 NOTE — ED NOTES
AVS provided and reviewed with the patient. The patient verbalized understanding of care at home, follow up care, and emergent symptoms to return for. The patient verbalized understanding of medication side effects and restrictions. No questions or concerns verbalized at this time. The patient is alert, oriented, stable, and ambulatory out of the department at the time of discharge.        Jose Santamaria RN  09/12/23 0627

## 2023-09-15 ENCOUNTER — OFFICE VISIT (OUTPATIENT)
Dept: ORTHOPEDIC SURGERY | Age: 53
End: 2023-09-15

## 2023-09-15 DIAGNOSIS — M25.511 RIGHT SHOULDER PAIN, UNSPECIFIED CHRONICITY: Primary | ICD-10-CM

## 2023-09-15 NOTE — PROGRESS NOTES
Date:  9/15/2023    Name:  Yung Marcum  Address:  [address] [place] [id number]    :  1970      Age:   47 y.o.    SSN:  [id number]      Medical Record Number:  [id number]    Reason for Visit:    Chief Complaint    Shoulder Pain (NP Rt shoulder)      DOS:9/15/2023     HPI: Mesha Oh is a 47 y.o. male here today for new patient evaluation regarding his right shoulder. He is right-hand dominant. 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 The patient reports that he had a normal shoulder up until Monday this week when he was moving furniture. He felt a burning and ripping in his shoulder while doing so. He went to the ER and there was concern for potential shoulder injury and he was sent to me. He did have a big pop the other day. He reported some intense pain to the shoulder that goes down the arm. Patient overall is relatively healthy but does report smoking 1 to 1.5 packs a day. Pain Assessment  Location of Pain: Shoulder  Location Modifiers: Right  Severity of Pain: 8  Quality of Pain: Sharp, Aching, Popping  Duration of Pain: Persistent  Frequency of Pain: Constant  Aggravating Factors: Stretching  Limiting Behavior: Yes  Relieving Factors: Ice, Nsaids  Result of Injury: Yes  Work-Related Injury: No  Are there other pain locations you wish to document?: No  ROS: All systems reviewed on patient intake form. Pertinent items are noted in HPI.         Past Medical History:   Diagnosis Date    MRSA (methicillin resistant staph aureus) culture positive 2011    Finger positive for MRSA    Recurrent otitis media of both ears     Smoking history         Past Surgical History:   Procedure Laterality Date    FRACTURE SURGERY Left     ORTHOPEDIC SURGERY      L elbow    TYMPANOSTOMY TUBE PLACEMENT      15 tubes in right, 16 tubes in left over the years       Family History   Problem Relation Age of Onset    Heart Attack

## 2023-09-27 ENCOUNTER — OFFICE VISIT (OUTPATIENT)
Dept: INTERNAL MEDICINE CLINIC | Age: 53
End: 2023-09-27
Payer: COMMERCIAL

## 2023-09-27 VITALS
TEMPERATURE: 97.3 F | DIASTOLIC BLOOD PRESSURE: 82 MMHG | RESPIRATION RATE: 14 BRPM | WEIGHT: 172 LBS | HEART RATE: 92 BPM | HEIGHT: 72 IN | BODY MASS INDEX: 23.3 KG/M2 | SYSTOLIC BLOOD PRESSURE: 128 MMHG | OXYGEN SATURATION: 97 %

## 2023-09-27 DIAGNOSIS — M25.511 ACUTE PAIN OF RIGHT SHOULDER: ICD-10-CM

## 2023-09-27 DIAGNOSIS — M25.511 ACUTE PAIN OF RIGHT SHOULDER: Primary | ICD-10-CM

## 2023-09-27 PROCEDURE — 99213 OFFICE O/P EST LOW 20 MIN: CPT | Performed by: NURSE PRACTITIONER

## 2023-09-27 RX ORDER — CYCLOBENZAPRINE HCL 10 MG
10 TABLET ORAL 2 TIMES DAILY PRN
Qty: 40 TABLET | Refills: 0 | Status: SHIPPED | OUTPATIENT
Start: 2023-09-27 | End: 2023-09-27

## 2023-09-27 RX ORDER — PREDNISONE 10 MG/1
TABLET ORAL
Qty: 30 TABLET | Refills: 0 | Status: SHIPPED | OUTPATIENT
Start: 2023-09-27

## 2023-09-27 RX ORDER — CYCLOBENZAPRINE HCL 10 MG
10 TABLET ORAL 2 TIMES DAILY PRN
Qty: 40 TABLET | Refills: 0 | Status: SHIPPED | OUTPATIENT
Start: 2023-09-27 | End: 2023-10-17

## 2023-09-27 RX ORDER — PREDNISONE 10 MG/1
TABLET ORAL
Qty: 30 TABLET | Refills: 0 | Status: SHIPPED | OUTPATIENT
Start: 2023-09-27 | End: 2023-09-27 | Stop reason: SDUPTHER

## 2023-09-27 RX ORDER — IBUPROFEN 600 MG/1
600 TABLET ORAL EVERY 8 HOURS PRN
Qty: 15 TABLET | Refills: 0 | Status: CANCELLED | OUTPATIENT
Start: 2023-09-27 | End: 2023-10-02

## 2023-09-27 SDOH — ECONOMIC STABILITY: FOOD INSECURITY: WITHIN THE PAST 12 MONTHS, YOU WORRIED THAT YOUR FOOD WOULD RUN OUT BEFORE YOU GOT MONEY TO BUY MORE.: NEVER TRUE

## 2023-09-27 SDOH — ECONOMIC STABILITY: FOOD INSECURITY: WITHIN THE PAST 12 MONTHS, THE FOOD YOU BOUGHT JUST DIDN'T LAST AND YOU DIDN'T HAVE MONEY TO GET MORE.: NEVER TRUE

## 2023-09-27 ASSESSMENT — ANXIETY QUESTIONNAIRES
1. FEELING NERVOUS, ANXIOUS, OR ON EDGE: 0
3. WORRYING TOO MUCH ABOUT DIFFERENT THINGS: 0
4. TROUBLE RELAXING: 0
GAD7 TOTAL SCORE: 0
2. NOT BEING ABLE TO STOP OR CONTROL WORRYING: 0
7. FEELING AFRAID AS IF SOMETHING AWFUL MIGHT HAPPEN: 0
6. BECOMING EASILY ANNOYED OR IRRITABLE: 0
IF YOU CHECKED OFF ANY PROBLEMS ON THIS QUESTIONNAIRE, HOW DIFFICULT HAVE THESE PROBLEMS MADE IT FOR YOU TO DO YOUR WORK, TAKE CARE OF THINGS AT HOME, OR GET ALONG WITH OTHER PEOPLE: NOT DIFFICULT AT ALL
5. BEING SO RESTLESS THAT IT IS HARD TO SIT STILL: 0

## 2023-09-27 ASSESSMENT — ENCOUNTER SYMPTOMS
COUGH: 0
VOMITING: 0
SORE THROAT: 0
DIARRHEA: 0
SINUS PAIN: 0
CHEST TIGHTNESS: 0
CONSTIPATION: 0
NAUSEA: 0
SHORTNESS OF BREATH: 0

## 2023-09-27 ASSESSMENT — PATIENT HEALTH QUESTIONNAIRE - PHQ9
SUM OF ALL RESPONSES TO PHQ QUESTIONS 1-9: 0
6. FEELING BAD ABOUT YOURSELF - OR THAT YOU ARE A FAILURE OR HAVE LET YOURSELF OR YOUR FAMILY DOWN: 0
SUM OF ALL RESPONSES TO PHQ QUESTIONS 1-9: 0
SUM OF ALL RESPONSES TO PHQ QUESTIONS 1-9: 0
2. FEELING DOWN, DEPRESSED OR HOPELESS: 0
1. LITTLE INTEREST OR PLEASURE IN DOING THINGS: 0
8. MOVING OR SPEAKING SO SLOWLY THAT OTHER PEOPLE COULD HAVE NOTICED. OR THE OPPOSITE, BEING SO FIGETY OR RESTLESS THAT YOU HAVE BEEN MOVING AROUND A LOT MORE THAN USUAL: 0
10. IF YOU CHECKED OFF ANY PROBLEMS, HOW DIFFICULT HAVE THESE PROBLEMS MADE IT FOR YOU TO DO YOUR WORK, TAKE CARE OF THINGS AT HOME, OR GET ALONG WITH OTHER PEOPLE: 0
9. THOUGHTS THAT YOU WOULD BE BETTER OFF DEAD, OR OF HURTING YOURSELF: 0
7. TROUBLE CONCENTRATING ON THINGS, SUCH AS READING THE NEWSPAPER OR WATCHING TELEVISION: 0
4. FEELING TIRED OR HAVING LITTLE ENERGY: 0
5. POOR APPETITE OR OVEREATING: 0
SUM OF ALL RESPONSES TO PHQ9 QUESTIONS 1 & 2: 0
SUM OF ALL RESPONSES TO PHQ QUESTIONS 1-9: 0
3. TROUBLE FALLING OR STAYING ASLEEP: 0

## 2023-09-27 NOTE — PROGRESS NOTES
[address] [facility] Internal Medicine  [facility], [address] [facility]  Tel:[phone]    Shalonda Davison is a 47 y.o. male who presents today for his medical conditions/complaints as noted below. Shalonda Davison is c/o of Shoulder Pain (Hurt moving furniture, tear in rotory cuff. /Right Shoulder)      Chief Complaint   Patient presents with    Shoulder Pain     Hurt moving furniture, tear in rotory cuff. /Right Shoulder       HPI:     Shoulder Pain   The pain is present in the right shoulder. This is a new problem. The current episode started 1 to 4 weeks ago (2-3 weeks ago). There has been a history of trauma (Was lifting couch up a staircase and felt a pop with pain). The problem occurs constantly. The problem has been unchanged. The quality of the pain is described as aching and dull. The pain is at a severity of 6/10. The pain is moderate. Associated symptoms include an inability to bear weight, joint swelling and a limited range of motion. Pertinent negatives include no fever, itching, numbness or stiffness. The symptoms are aggravated by activity. He has tried NSAIDS (muscle relaxer) for the symptoms. The treatment provided mild relief.        Past Medical History:   Diagnosis Date    MRSA (methicillin resistant staph aureus) culture positive 09/09/2011    Finger positive for MRSA    Recurrent otitis media of both ears     Smoking history         Past Surgical History:   Procedure Laterality Date    FRACTURE SURGERY Left     ORTHOPEDIC SURGERY      L elbow    TYMPANOSTOMY TUBE PLACEMENT      15 tubes in right, 16 tubes in left over the years       Family History   Problem Relation Age of Onset    Heart Attack Father         5 MI; ~40 at first MI    High Cholesterol Father     Asthma Father     Diabetes Father     Hypertension Father     No Known Problems Brother     Mult Sclerosis Maternal Grandfather        Social History     Tobacco Use    Smoking status: Every Day

## 2023-09-27 NOTE — PROGRESS NOTES
Additional Comments  [facility] [place]           Refill request for Prednisone; Cyclobenzaprine    medication. Name of [address] [place] [address]       Last visit - 9/27/23     Pending visit - None     Last refill -09/27/23; 09/27/23      Medication Contract signed -   Last Oarrs ran-         Additional Comments  [facility].  Sher Fernando

## 2023-09-27 NOTE — PATIENT INSTRUCTIONS
[employer] -  If you still have questions about your insurance, bill, or payment options, please contact the [address] [place] [address] at [phone] or [address] [place] [address] [phone]

## 2023-10-03 ENCOUNTER — HOSPITAL ENCOUNTER (OUTPATIENT)
Dept: MRI IMAGING | Age: 53
Discharge: HOME | End: 2023-10-03
Attending: STUDENT IN AN ORGANIZED HEALTH CARE EDUCATION/TRAINING PROGRAM
Payer: COMMERCIAL

## 2023-10-03 DIAGNOSIS — M25.511 RIGHT SHOULDER PAIN, UNSPECIFIED CHRONICITY: ICD-10-CM

## 2023-10-03 PROCEDURE — 73221 MRI JOINT UPR EXTREM W/O DYE: CPT

## 2023-10-05 ENCOUNTER — TELEPHONE (OUTPATIENT)
Dept: ORTHOPEDIC SURGERY | Age: 53
End: 2023-10-05

## 2023-10-05 NOTE — TELEPHONE ENCOUNTER
General Question     Subject: WORK EXCUSE  Patient and /or Facility Request: Jethro Ruiz  Contact Number: [phone]    PT CLLED TO [facility] MRI RESULTS SAID WORK EXCUSE IS ONLY THRU 10/11 NOT FEELING BETTER FOLLOW UP APPT IN 10/ 16 NEEDS WORK EXCUSE THRU APPT DATE.  CAN CLL W/ TR IF POSSIBLE    PLEASE CLL PT AT [facility] NUMBER LISTED WHEN WORK EXCUSE IS READY

## 2023-10-05 NOTE — TELEPHONE ENCOUNTER
Patient called and informed of MRI TR. Note wa also put in patient's chart for off work until seen in office.

## 2023-10-16 ENCOUNTER — OFFICE VISIT (OUTPATIENT)
Dept: ORTHOPEDIC SURGERY | Age: 53
End: 2023-10-16
Payer: COMMERCIAL

## 2023-10-16 VITALS — WEIGHT: 172 LBS | HEIGHT: 72 IN | BODY MASS INDEX: 23.3 KG/M2

## 2023-10-16 DIAGNOSIS — M25.511 RIGHT SHOULDER PAIN, UNSPECIFIED CHRONICITY: Primary | ICD-10-CM

## 2023-10-16 DIAGNOSIS — S46.011A TRAUMATIC INCOMPLETE TEAR OF RIGHT ROTATOR CUFF, INITIAL ENCOUNTER: ICD-10-CM

## 2023-10-16 PROCEDURE — 99214 OFFICE O/P EST MOD 30 MIN: CPT | Performed by: STUDENT IN AN ORGANIZED HEALTH CARE EDUCATION/TRAINING PROGRAM

## 2023-10-16 NOTE — PROGRESS NOTES
Chief Complaint  Shoulder Pain (TR MRI Rt shoulder)      History of Present Illness:  Bulmaro Hess is a pleasant 47 y.o. male here today for repeat evaluation of his right shoulder. He notes his shoulder is feeling little bit better but he still has difficulty with lifting forward or overhead. Especially with his job where he lifts heavier items. Prior HPI 9/15/23:  Joseph Nunez is a 47 y.o. male here today for new patient evaluation regarding his right shoulder. He is right-hand dominant. 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 The patient reports that he had a normal shoulder up until Monday this week when he was moving furniture. He felt a burning and ripping in his shoulder while doing so. He went to the ER and there was concern for potential shoulder injury and he was sent to me. He did have a big pop the other day. He reported some intense pain to the shoulder that goes down the arm. Patient overall is relatively healthy but does report smoking 1 to 1.5 packs a day. Medical History:  Patient's medications, allergies, past medical, surgical, social and family histories were reviewed and updated as appropriate. Pertinent items are noted in HPI  Review of systems reviewed from Patient History Form available in the patient's chart under the Media tab. Vital Signs: There were no vitals filed for this visit. Constitutional: In no apparent distress. Normal affect. Alert and oriented X3 and is cooperative. Right shoulder exam     Inspection:  Held in a normal posture. Normal contour at the acromioclavicular joint. No swelling, ecchymosis, or erythema about the shoulder. No atrophy appreciated. No scapular winging. Palpation:  No subacromial crepitus. No tenderness of the AC joint. Nontender over the os acromiale. Tender over the bicipital groove and the greater tuberosity.      Range of Motion: Full passive and

## 2023-10-18 ENCOUNTER — TELEPHONE (OUTPATIENT)
Dept: ORTHOPEDIC SURGERY | Age: 53
End: 2023-10-18

## 2023-10-18 NOTE — TELEPHONE ENCOUNTER
Dread Campbellist from [facility] called wanting to schedule for a p2p. Informed [place] Artist someone from clinic will get in touch.

## 2023-10-18 NOTE — TELEPHONE ENCOUNTER
Submitted precert for surgery. The precert is pending. This message was attached to the case:     Pending: In RN review  Tracking #[id number]  Note: [employer] patients must complete physical therapy before being eligible for Arthroscopic surgery. Our records indicate PT has not been attempted in the past three months. If PT has not been performed, please consider withdrawing this request. If PT has been performed, please submit clinical documentation of completed physical therapy in the past three months.

## 2023-10-19 NOTE — TELEPHONE ENCOUNTER
DENIED  Date: 11/6/2023  Type of SX: OUTPATIENT  Location: [facility]  CPT: 56012  DX: [phone]  SX area: [address]: [address] [place] [address]  Peer to peer: [phone]  Reference # Authorization #[id number]  Tracking #[id number]    CPT code 14960 is a NPR/

## 2023-10-21 ENCOUNTER — TELEPHONE (OUTPATIENT)
Dept: ORTHOPEDIC SURGERY | Age: 53
End: 2023-10-21

## 2023-10-23 ENCOUNTER — TELEPHONE (OUTPATIENT)
Dept: ORTHOPEDIC SURGERY | Age: 53
End: 2023-10-23

## 2023-10-24 NOTE — TELEPHONE ENCOUNTER
General Question     Subject: DISABILITY PPW  Patient and /or Facility RequestJule Libman  Contact Number: [phone]    PT CALLED TO SEE IF DISABILITY PAPERWORK HAD BEEN COMPLETED YET. PLEASE ADVISE.

## 2023-10-26 ENCOUNTER — HOSPITAL ENCOUNTER (OUTPATIENT)
Age: 53
Discharge: HOME | End: 2023-10-26
Payer: COMMERCIAL

## 2023-10-26 ENCOUNTER — OFFICE VISIT (OUTPATIENT)
Dept: INTERNAL MEDICINE CLINIC | Age: 53
End: 2023-10-26
Payer: COMMERCIAL

## 2023-10-26 VITALS
SYSTOLIC BLOOD PRESSURE: 102 MMHG | BODY MASS INDEX: 24.14 KG/M2 | OXYGEN SATURATION: 97 % | HEART RATE: 88 BPM | DIASTOLIC BLOOD PRESSURE: 70 MMHG | TEMPERATURE: 96.4 F | RESPIRATION RATE: 12 BRPM | HEIGHT: 72 IN

## 2023-10-26 DIAGNOSIS — Z00.00 WELL ADULT EXAM: ICD-10-CM

## 2023-10-26 DIAGNOSIS — M25.511 ACUTE PAIN OF RIGHT SHOULDER: ICD-10-CM

## 2023-10-26 DIAGNOSIS — M25.511 RIGHT SHOULDER PAIN, UNSPECIFIED CHRONICITY: Primary | ICD-10-CM

## 2023-10-26 DIAGNOSIS — M25.511 RIGHT SHOULDER PAIN, UNSPECIFIED CHRONICITY: ICD-10-CM

## 2023-10-26 DIAGNOSIS — Z01.818 PREOP EXAMINATION: ICD-10-CM

## 2023-10-26 LAB
ANION GAP SERPL CALCULATED.3IONS-SCNC: 13 MMOL/L (ref 3–16)
BASOPHILS # BLD: 0 K/UL (ref 0–0.2)
BASOPHILS NFR BLD: 0.6 %
BUN SERPL-MCNC: 10 MG/DL (ref 7–20)
CALCIUM SERPL-MCNC: 9.9 MG/DL (ref 8.3–10.6)
CHLORIDE SERPL-SCNC: 101 MMOL/L (ref 99–110)
CHOLEST SERPL-MCNC: 181 MG/DL (ref 0–199)
CO2 SERPL-SCNC: 26 MMOL/L (ref 21–32)
CREAT SERPL-MCNC: 1 MG/DL (ref 0.9–1.3)
DEPRECATED RDW RBC AUTO: 13.5 % (ref 12.4–15.4)
EOSINOPHIL # BLD: 0.2 K/UL (ref 0–0.6)
EOSINOPHIL NFR BLD: 3.1 %
GFR SERPLBLD CREATININE-BSD FMLA CKD-EPI: >60 ML/MIN/{1.73_M2}
GLUCOSE SERPL-MCNC: 79 MG/DL (ref 70–99)
HCT VFR BLD AUTO: 48.8 % (ref 40.5–52.5)
HDLC SERPL-MCNC: 43 MG/DL (ref 40–60)
HGB BLD-MCNC: 17 G/DL (ref 13.5–17.5)
LDLC SERPL CALC-MCNC: 119 MG/DL
LYMPHOCYTES # BLD: 1.7 K/UL (ref 1–5.1)
LYMPHOCYTES NFR BLD: 24.2 %
MCH RBC QN AUTO: 33.7 PG (ref 26–34)
MCHC RBC AUTO-ENTMCNC: 34.9 G/DL (ref 31–36)
MCV RBC AUTO: 96.7 FL (ref 80–100)
MONOCYTES # BLD: 0.5 K/UL (ref 0–1.3)
MONOCYTES NFR BLD: 7 %
NEUTROPHILS # BLD: 4.6 K/UL (ref 1.7–7.7)
NEUTROPHILS NFR BLD: 65.1 %
PLATELET # BLD AUTO: 234 K/UL (ref 135–450)
PMV BLD AUTO: 8.7 FL (ref 5–10.5)
POTASSIUM SERPL-SCNC: 4.1 MMOL/L (ref 3.5–5.1)
RBC # BLD AUTO: 5.05 M/UL (ref 4.2–5.9)
SODIUM SERPL-SCNC: 140 MMOL/L (ref 136–145)
TRIGL SERPL-MCNC: 96 MG/DL (ref 0–150)
VLDLC SERPL CALC-MCNC: 19 MG/DL
WBC # BLD AUTO: 7.1 K/UL (ref 4–11)

## 2023-10-26 PROCEDURE — 80048 BASIC METABOLIC PNL TOTAL CA: CPT

## 2023-10-26 PROCEDURE — 99214 OFFICE O/P EST MOD 30 MIN: CPT | Performed by: NURSE PRACTITIONER

## 2023-10-26 PROCEDURE — 93000 ELECTROCARDIOGRAM COMPLETE: CPT | Performed by: NURSE PRACTITIONER

## 2023-10-26 PROCEDURE — 80061 LIPID PANEL: CPT

## 2023-10-26 PROCEDURE — 36415 COLL VENOUS BLD VENIPUNCTURE: CPT

## 2023-10-26 PROCEDURE — 85025 COMPLETE CBC W/AUTO DIFF WBC: CPT

## 2023-10-26 RX ORDER — CYCLOBENZAPRINE HCL 5 MG
5 TABLET ORAL 2 TIMES DAILY PRN
Qty: 40 TABLET | Refills: 0 | Status: SHIPPED | OUTPATIENT
Start: 2023-10-26 | End: 2023-11-15

## 2023-10-26 ASSESSMENT — ENCOUNTER SYMPTOMS
NAUSEA: 0
SINUS PAIN: 0
CONSTIPATION: 0
CHEST TIGHTNESS: 0
SHORTNESS OF BREATH: 0
COUGH: 0
VOMITING: 0
DIARRHEA: 0
SORE THROAT: 0

## 2023-10-27 ENCOUNTER — TELEPHONE (OUTPATIENT)
Dept: INTERNAL MEDICINE CLINIC | Age: 53
End: 2023-10-27

## 2023-11-03 ENCOUNTER — TELEPHONE (OUTPATIENT)
Dept: ORTHOPEDIC SURGERY | Age: 53
End: 2023-11-03

## 2023-11-03 NOTE — TELEPHONE ENCOUNTER
Patient called and VM was left stating that we have received denial for surgery and unfornunately we will need to reschedule surgery. Call back number was given.

## 2023-11-03 NOTE — TELEPHONE ENCOUNTER
Patient called on 11/2 and 11/3 to inform that we received a denial for the appeal for surgery so we will have to reschedule his surgery. He did not answer and VM box is full.

## 2023-11-08 ENCOUNTER — TELEPHONE (OUTPATIENT)
Dept: ORTHOPEDIC SURGERY | Age: 53
End: 2023-11-08

## 2023-11-08 ENCOUNTER — TELEPHONE (OUTPATIENT)
Dept: INTERNAL MEDICINE CLINIC | Age: 53
End: 2023-11-08

## 2023-11-08 NOTE — TELEPHONE ENCOUNTER
Patient is calling and states that the referral that was given to him for surgery is not covered by his insurance and needs another referral that is covered. States that [facility] is covered, but [facility] is not.

## 2023-11-08 NOTE — TELEPHONE ENCOUNTER
Is the provider or location not covered?  If location is not covered this is at the discretion of the surgeon, not myself

## 2023-11-08 NOTE — TELEPHONE ENCOUNTER
Patient called and VM was left stating that I will be trying to appeal again the denial of his surgery. Call back number was given for any questions.

## 2023-11-09 ENCOUNTER — TELEPHONE (OUTPATIENT)
Dept: ORTHOPEDIC SURGERY | Age: 53
End: 2023-11-09

## 2023-11-09 NOTE — TELEPHONE ENCOUNTER
Surgery and/or Procedure Scheduling     Contact Name: Young Mercer  Surgical/Procedure Request:   Patient Contact Number: [phone]      THE PT IS WONDERING IF HIS INS WOULD COVER THE SX BY DR BARRIGA AT A DIFFERENT LOCATION OTHER THAN [facility].   PLEASE CALL HIM BACK AT THE ABOVE PHONE #

## 2023-11-09 NOTE — TELEPHONE ENCOUNTER
Called and spoke with patient and suggested he contact the ortho office regarding covered facilities for surgery.

## 2023-11-13 NOTE — TELEPHONE ENCOUNTER
Patient called back and VM left stating that I don't believe that change in location of surgery would affect approval for surgery due to the reason for denial. Patient told that we will need to wait on the appeal for apprpoval. Call back number was given as well.

## 2023-11-16 NOTE — TELEPHONE ENCOUNTER
OTHER    PATIENT WOULD LIKE TO SPEAK WITH CLINICAL REGARDING INSURANCE COVERAGE FOR SURGERY. HE STATED THAT THERE HAS BEEN AN APPEAL TWICE THROUGH INSURANCE AND HAS BEEN DENIED. HE STATED THAT HE IS UNABLE TO WORK IN THE MEANTIME AND LOOKING TO GAIN ADDITIONAL OPTIONS.      PLEASE ADVISE

## 2023-11-17 NOTE — TELEPHONE ENCOUNTER
Patient called back and informed that I will need his signature for some appeal papers. Patient also asked if possible I could try to extend STD paperwork.

## 2023-11-30 ENCOUNTER — TELEPHONE (OUTPATIENT)
Dept: ORTHOPEDIC SURGERY | Age: 53
End: 2023-11-30

## 2023-11-30 NOTE — TELEPHONE ENCOUNTER
Other PATIENT CALLED TO CHECK THE STATUS OF THE APPEAL FOR HIS SURGERY. PLS CALL TO ADVISE [phone]

## 2023-12-01 ENCOUNTER — TELEPHONE (OUTPATIENT)
Dept: ORTHOPEDIC SURGERY | Age: 53
End: 2023-12-01

## 2023-12-07 ENCOUNTER — TELEPHONE (OUTPATIENT)
Dept: ORTHOPEDIC SURGERY | Age: 53
End: 2023-12-07

## 2023-12-07 NOTE — TELEPHONE ENCOUNTER
Patient called and informed that I had called David and sounds as if that case is still pending. I will continue to check up but are waiting to hear back from [facility]. Appt was made with patient to see LUCIO Boyer to hopefully continue to extend STD.

## 2023-12-07 NOTE — TELEPHONE ENCOUNTER
Appointment Request     Patient requesting earlier appointment: Yes  Appointment offered to patient: 12-19-23 1:20  Patient Contact Number: [phone]   \  PATIENTS PHONE WILL BE CUT OFF AFTER TODAY 12/7/23    PATIENT CALLED IN REQUESTING A SOONER APPT STATES HE NEEDS SURGERY IMMEDIATELY AND HE IS CUT OFF FROM SHORT TERM DISABILITY UNTIL HE CAN BE SEEN

## 2023-12-08 ENCOUNTER — OFFICE VISIT (OUTPATIENT)
Dept: ORTHOPEDIC SURGERY | Age: 53
End: 2023-12-08

## 2023-12-08 VITALS — WEIGHT: 178 LBS | HEIGHT: 72 IN | BODY MASS INDEX: 24.11 KG/M2

## 2023-12-08 DIAGNOSIS — S46.011A TRAUMATIC INCOMPLETE TEAR OF RIGHT ROTATOR CUFF, INITIAL ENCOUNTER: ICD-10-CM

## 2023-12-08 DIAGNOSIS — M25.511 RIGHT SHOULDER PAIN, UNSPECIFIED CHRONICITY: Primary | ICD-10-CM

## 2023-12-08 PROCEDURE — 99024 POSTOP FOLLOW-UP VISIT: CPT | Performed by: PHYSICIAN ASSISTANT

## 2023-12-08 NOTE — PROGRESS NOTES
subpectoral biceps tenodesis. Roslyn Marquez is in agreement with this plan. All questions were answered to patient's satisfaction and was encouraged to call with any further questions. LUCIO Mayen    Orthopedic Surgery and Sports Medicine  12/8/2023      This dictation was performed with a verbal recognition program [facility]) and it was checked for errors. It is possible that there are still dictated errors within this office note. If so, please bring any errors to my attention for an addendum. All efforts were made to ensure that this office note is accurate.

## 2024-01-03 ENCOUNTER — TELEPHONE (OUTPATIENT)
Dept: ORTHOPEDIC SURGERY | Age: 54
End: 2024-01-03

## 2024-01-05 ENCOUNTER — OFFICE VISIT (OUTPATIENT)
Dept: ORTHOPEDIC SURGERY | Age: 54
End: 2024-01-05
Payer: COMMERCIAL

## 2024-01-05 VITALS — HEIGHT: 72 IN | BODY MASS INDEX: 24.11 KG/M2 | WEIGHT: 178 LBS

## 2024-01-05 DIAGNOSIS — S46.011A TRAUMATIC INCOMPLETE TEAR OF RIGHT ROTATOR CUFF, INITIAL ENCOUNTER: Primary | ICD-10-CM

## 2024-01-05 PROCEDURE — 99214 OFFICE O/P EST MOD 30 MIN: CPT | Performed by: STUDENT IN AN ORGANIZED HEALTH CARE EDUCATION/TRAINING PROGRAM

## 2024-01-05 NOTE — PROGRESS NOTES
Chief Complaint  Shoulder Pain (Right shoulder pain. )      History of Present Illness:  Juan Miguel Reyes is a pleasant 53 y.o. male here today for repeat evaluation regarding his right shoulder.  The patient reports that his shoulder is getting worse.  He reports a lot of clicking and weakness to the arm.  He is having difficulty lifting his arm overhead    Prior HPI 10/16/23:  Juan Miguel Reyes is a pleasant 53 y.o. male here today for repeat evaluation of his right shoulder.  He notes his shoulder is feeling little bit better but he still has difficulty with lifting forward or overhead.  Especially with his job where he lifts heavier items.     Prior HPI 9/15/23:  Juan Miguel Reyes is a 53 y.o. male here today for new patient evaluation regarding his right shoulder.  He is right-hand dominant.  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  The patient reports that he had a normal shoulder up until Monday this week when he was moving furniture.  He felt a burning and ripping in his shoulder while doing so.  He went to the ER and there was concern for potential shoulder injury and he was sent to me.  He did have a big pop the other day.  He reported some intense pain to the shoulder that goes down the arm.  Patient overall is relatively healthy but does report smoking 1 to 1.5 packs a day.    Pain Assessment  Location of Pain: Shoulder  Location Modifiers: Right  Severity of Pain: 9  Quality of Pain: Throbbing, Sharp, Dull, Aching, Popping  Duration of Pain: Persistent  Frequency of Pain: Constant    Medical History:  Patient's medications, allergies, past medical, surgical, social and family histories were reviewed and updated as appropriate.    Pertinent items are noted in HPI  Review of systems reviewed from Patient History Form available in the patient's chart under the Media tab.       Vital Signs:  There were no vitals filed for this visit.      Constitutional: In no

## 2024-01-10 ENCOUNTER — TELEPHONE (OUTPATIENT)
Dept: ORTHOPEDIC SURGERY | Age: 54
End: 2024-01-10

## 2024-01-11 NOTE — TELEPHONE ENCOUNTER
[facility], still pending.  Reference# [id number].

## 2024-01-12 NOTE — TELEPHONE ENCOUNTER
DENIED  Date: 1/29/2024  Type of SX: OUTPATIENT  Location: [facility]  CPT: 79351, 71093   DX: S46.011A   SX area: [facility]  REASON: CRITERIA NOT MET  Peer to peer: [phone] for all Urgent Requests.   Reference # [id number]     Letter scanned below

## 2024-01-17 ENCOUNTER — HOSPITAL ENCOUNTER (OUTPATIENT)
Age: 54
Discharge: HOME | End: 2024-01-17
Payer: COMMERCIAL

## 2024-01-17 ENCOUNTER — OFFICE VISIT (OUTPATIENT)
Dept: INTERNAL MEDICINE CLINIC | Age: 54
End: 2024-01-17
Payer: COMMERCIAL

## 2024-01-17 VITALS
OXYGEN SATURATION: 98 % | HEIGHT: 72 IN | WEIGHT: 185.2 LBS | DIASTOLIC BLOOD PRESSURE: 78 MMHG | TEMPERATURE: 97.4 F | SYSTOLIC BLOOD PRESSURE: 120 MMHG | BODY MASS INDEX: 25.09 KG/M2 | HEART RATE: 79 BPM

## 2024-01-17 DIAGNOSIS — S46.011D TRAUMATIC TEAR OF RIGHT ROTATOR CUFF, UNSPECIFIED TEAR EXTENT, SUBSEQUENT ENCOUNTER: Primary | ICD-10-CM

## 2024-01-17 DIAGNOSIS — S46.011D TRAUMATIC TEAR OF RIGHT ROTATOR CUFF, UNSPECIFIED TEAR EXTENT, SUBSEQUENT ENCOUNTER: ICD-10-CM

## 2024-01-17 LAB
ALBUMIN SERPL-MCNC: 4.3 G/DL (ref 3.4–5)
ALBUMIN/GLOB SERPL: 1.7 {RATIO} (ref 1.1–2.2)
ALP SERPL-CCNC: 87 U/L (ref 40–129)
ALT SERPL-CCNC: 15 U/L (ref 10–40)
ANION GAP SERPL CALCULATED.3IONS-SCNC: 11 MMOL/L (ref 3–16)
AST SERPL-CCNC: 21 U/L (ref 15–37)
BASOPHILS # BLD: 0.1 K/UL (ref 0–0.2)
BASOPHILS NFR BLD: 0.9 %
BILIRUB SERPL-MCNC: 0.3 MG/DL (ref 0–1)
BUN SERPL-MCNC: 9 MG/DL (ref 7–20)
CALCIUM SERPL-MCNC: 9.8 MG/DL (ref 8.3–10.6)
CHLORIDE SERPL-SCNC: 104 MMOL/L (ref 99–110)
CO2 SERPL-SCNC: 26 MMOL/L (ref 21–32)
CREAT SERPL-MCNC: 0.8 MG/DL (ref 0.9–1.3)
DEPRECATED RDW RBC AUTO: 13.4 % (ref 12.4–15.4)
EOSINOPHIL # BLD: 0.3 K/UL (ref 0–0.6)
EOSINOPHIL NFR BLD: 3.9 %
GFR SERPLBLD CREATININE-BSD FMLA CKD-EPI: >60 ML/MIN/{1.73_M2}
GLUCOSE SERPL-MCNC: 74 MG/DL (ref 70–99)
HCT VFR BLD AUTO: 46 % (ref 40.5–52.5)
HGB BLD-MCNC: 15.5 G/DL (ref 13.5–17.5)
LYMPHOCYTES # BLD: 2.2 K/UL (ref 1–5.1)
LYMPHOCYTES NFR BLD: 32.7 %
MCH RBC QN AUTO: 33.2 PG (ref 26–34)
MCHC RBC AUTO-ENTMCNC: 33.8 G/DL (ref 31–36)
MCV RBC AUTO: 98.4 FL (ref 80–100)
MONOCYTES # BLD: 0.5 K/UL (ref 0–1.3)
MONOCYTES NFR BLD: 6.9 %
NEUTROPHILS # BLD: 3.8 K/UL (ref 1.7–7.7)
NEUTROPHILS NFR BLD: 55.6 %
PLATELET # BLD AUTO: 232 K/UL (ref 135–450)
PMV BLD AUTO: 9.1 FL (ref 5–10.5)
POTASSIUM SERPL-SCNC: 4.9 MMOL/L (ref 3.5–5.1)
PROT SERPL-MCNC: 6.8 G/DL (ref 6.4–8.2)
RBC # BLD AUTO: 4.68 M/UL (ref 4.2–5.9)
SODIUM SERPL-SCNC: 141 MMOL/L (ref 136–145)
WBC # BLD AUTO: 6.9 K/UL (ref 4–11)

## 2024-01-17 PROCEDURE — 80053 COMPREHEN METABOLIC PANEL: CPT

## 2024-01-17 PROCEDURE — 36415 COLL VENOUS BLD VENIPUNCTURE: CPT

## 2024-01-17 PROCEDURE — 85025 COMPLETE CBC W/AUTO DIFF WBC: CPT

## 2024-01-17 PROCEDURE — 99214 OFFICE O/P EST MOD 30 MIN: CPT | Performed by: NURSE PRACTITIONER

## 2024-01-17 ASSESSMENT — PATIENT HEALTH QUESTIONNAIRE - PHQ9
1. LITTLE INTEREST OR PLEASURE IN DOING THINGS: 0
SUM OF ALL RESPONSES TO PHQ9 QUESTIONS 1 & 2: 0
SUM OF ALL RESPONSES TO PHQ QUESTIONS 1-9: 0
2. FEELING DOWN, DEPRESSED OR HOPELESS: 0

## 2024-01-17 ASSESSMENT — ENCOUNTER SYMPTOMS
SINUS PAIN: 0
SHORTNESS OF BREATH: 0
COUGH: 0
NAUSEA: 0
VOMITING: 0
CHEST TIGHTNESS: 0
DIARRHEA: 0
CONSTIPATION: 0
SORE THROAT: 0

## 2024-01-17 NOTE — PROGRESS NOTES
[facility] PHYSICIAN PRACTICES  [facility] IM  [address] [facility] [address]  Dept: [phone]      Preoperative Consultation      Juan Miguel Reyes  YOB: 1970    Date of Service:  1/17/2024    Vitals:    01/17/24 0656   BP: 120/78   Pulse: 79   Temp: 97.4 °F (36.3 °C)   TempSrc: Temporal   SpO2: 98%   Weight: 84 kg (185 lb 3.2 oz)   Height: 1.829 m (6')      Wt Readings from Last 2 Encounters:   01/17/24 84 kg (185 lb 3.2 oz)   01/05/24 80.7 kg (178 lb)     BP Readings from Last 3 Encounters:   01/17/24 120/78   10/26/23 102/70   09/27/23 128/82      Chief Complaint   Patient presents with    Pre-op Exam     Pre Op  R. Rotator Cuff Repair on 1/29/24, Rebekah Birch      Allergies   Allergen Reactions    Pcn [Penicillins]      \"I was told that I was deathly allergic\"      Tolerates amoxicillin (MIN [place])     No outpatient medications have been marked as taking for the 1/17/24 encounter (Office Visit) with Macario Boyer APRN - CNP.     This patient presents to the office today for a preoperative consultation at the request of surgeon, Dr. Birch, who plans on performing right rotator cuff repair on January 29 at [facility].  The current problem began several weeks ago, and symptoms have been worsening with time.  Conservative therapy: N/A.    Planned anesthesia: General   Known anesthesia problems: None   Bleeding risk: No recent or remote history of abnormal bleeding  Personal or FH of DVT/PE: No    Patient objection to receiving blood products: No        Revised Cardiac Risk Index +1  +0    1.) High-Risk Surgery                                                               ? Intraperitoneal   ? Intrathoracic   ? Suprainguinal vascular      2.) History of ischemic heart disease                                              ? History of MI   ? History of positiveexercise test   ? Current chest paint considered due       to myocardial ischemia   ?

## 2024-01-24 NOTE — PROGRESS NOTES
1.  Do not eat or drink anything after 12 midnight prior to surgery.  This includes no water, chewing gum or mints.  2.  Take the following pills with a small sip of water on the morning of surgery.  3.  Aspirin, Ibuprofen, Advil, Naproxen, Vitamin E and other Anti-inflammatory products should be stopped for 5 days before surgery or as directed by your physician.  4.  Check with your doctor regarding stopping Plavix, Coumadin, Lovenox, Fragmin or other blood thinners.  5.  Do not smoke and do not drink alcoholic beverages 24 hours prior to surgery.  This includes NA Beer.  6.  You may brush your teeth and gargle the morning of surgery.  DO NOT SWALLOW WATER.  7.  You MUST make arrangements for a responsible adult to take you home after your surgery.  You will not be allowed to leave alone or drive yourself home.  It is strongly suggested someone stay with you the first 24 hours.  Your surgery will be cancelled if you do not have a ride home.  8.  A parent/legal guardian must accompany a child scheduled for surgery and plan to stay at the hospital until the child is discharged.  Please do not bring other children with you.  9.  Please wear simple, loose fitting clothing to the hospital.  Do not bring valuables ( money, credit cards, checkbooks, etc.)  Do not wear any makeup (including no eye makeup) or nail polish on your fingers or toes.  10.  Do not wear any jewelry or piercing on the day of surgery.  All body piercing jewelry must be removed.  11.  If you have dentures, they will be removed before going to the OR; we will provide you a container.  If you wear contact lenses or glasses, they will be removed; please bring a case for them.  12.  Please see your family doctor/pediatrician for a history & physical and/or concerning medications.  Bring any test results/reports from your physician's office the day of surgery.    13.  Remember to bring Blood Bank Bracelet to the hospital on the day of surgery.  14.  If

## 2024-01-26 ENCOUNTER — ANESTHESIA EVENT (OUTPATIENT)
Dept: OPERATING ROOM | Age: 54
End: 2024-01-26
Payer: COMMERCIAL

## 2024-01-29 ENCOUNTER — HOSPITAL ENCOUNTER (OUTPATIENT)
Age: 54
Setting detail: OUTPATIENT SURGERY
Discharge: HOME | End: 2024-01-29
Attending: STUDENT IN AN ORGANIZED HEALTH CARE EDUCATION/TRAINING PROGRAM | Admitting: STUDENT IN AN ORGANIZED HEALTH CARE EDUCATION/TRAINING PROGRAM
Payer: COMMERCIAL

## 2024-01-29 ENCOUNTER — ANESTHESIA (OUTPATIENT)
Dept: OPERATING ROOM | Age: 54
End: 2024-01-29
Payer: COMMERCIAL

## 2024-01-29 VITALS
TEMPERATURE: 97.6 F | RESPIRATION RATE: 18 BRPM | SYSTOLIC BLOOD PRESSURE: 129 MMHG | BODY MASS INDEX: 24.79 KG/M2 | DIASTOLIC BLOOD PRESSURE: 88 MMHG | HEART RATE: 68 BPM | HEIGHT: 72 IN | OXYGEN SATURATION: 99 % | WEIGHT: 183 LBS

## 2024-01-29 DIAGNOSIS — G89.18 POST-OP PAIN: Primary | ICD-10-CM

## 2024-01-29 PROCEDURE — 7100000011 HC PHASE II RECOVERY - ADDTL 15 MIN: Performed by: STUDENT IN AN ORGANIZED HEALTH CARE EDUCATION/TRAINING PROGRAM

## 2024-01-29 PROCEDURE — 2500000003 HC RX 250 WO HCPCS: Performed by: NURSE ANESTHETIST, CERTIFIED REGISTERED

## 2024-01-29 PROCEDURE — 2720000010 HC SURG SUPPLY STERILE: Performed by: STUDENT IN AN ORGANIZED HEALTH CARE EDUCATION/TRAINING PROGRAM

## 2024-01-29 PROCEDURE — 2580000003 HC RX 258: Performed by: STUDENT IN AN ORGANIZED HEALTH CARE EDUCATION/TRAINING PROGRAM

## 2024-01-29 PROCEDURE — 2580000003 HC RX 258: Performed by: ANESTHESIOLOGY

## 2024-01-29 PROCEDURE — 64415 NJX AA&/STRD BRCH PLXS IMG: CPT | Performed by: ANESTHESIOLOGY

## 2024-01-29 PROCEDURE — 2709999900 HC NON-CHARGEABLE SUPPLY: Performed by: STUDENT IN AN ORGANIZED HEALTH CARE EDUCATION/TRAINING PROGRAM

## 2024-01-29 PROCEDURE — 6360000002 HC RX W HCPCS: Performed by: NURSE ANESTHETIST, CERTIFIED REGISTERED

## 2024-01-29 PROCEDURE — 7100000001 HC PACU RECOVERY - ADDTL 15 MIN: Performed by: STUDENT IN AN ORGANIZED HEALTH CARE EDUCATION/TRAINING PROGRAM

## 2024-01-29 PROCEDURE — 3700000001 HC ADD 15 MINUTES (ANESTHESIA): Performed by: STUDENT IN AN ORGANIZED HEALTH CARE EDUCATION/TRAINING PROGRAM

## 2024-01-29 PROCEDURE — 6360000002 HC RX W HCPCS: Performed by: ANESTHESIOLOGY

## 2024-01-29 PROCEDURE — 6360000002 HC RX W HCPCS: Performed by: STUDENT IN AN ORGANIZED HEALTH CARE EDUCATION/TRAINING PROGRAM

## 2024-01-29 PROCEDURE — 3600000014 HC SURGERY LEVEL 4 ADDTL 15MIN: Performed by: STUDENT IN AN ORGANIZED HEALTH CARE EDUCATION/TRAINING PROGRAM

## 2024-01-29 PROCEDURE — 7100000010 HC PHASE II RECOVERY - FIRST 15 MIN: Performed by: STUDENT IN AN ORGANIZED HEALTH CARE EDUCATION/TRAINING PROGRAM

## 2024-01-29 PROCEDURE — 3600000004 HC SURGERY LEVEL 4 BASE: Performed by: STUDENT IN AN ORGANIZED HEALTH CARE EDUCATION/TRAINING PROGRAM

## 2024-01-29 PROCEDURE — 3700000000 HC ANESTHESIA ATTENDED CARE: Performed by: STUDENT IN AN ORGANIZED HEALTH CARE EDUCATION/TRAINING PROGRAM

## 2024-01-29 PROCEDURE — C1713 ANCHOR/SCREW BN/BN,TIS/BN: HCPCS | Performed by: STUDENT IN AN ORGANIZED HEALTH CARE EDUCATION/TRAINING PROGRAM

## 2024-01-29 PROCEDURE — 29827 SHO ARTHRS SRG RT8TR CUF RPR: CPT | Performed by: STUDENT IN AN ORGANIZED HEALTH CARE EDUCATION/TRAINING PROGRAM

## 2024-01-29 PROCEDURE — 23430 REPAIR BICEPS TENDON: CPT | Performed by: STUDENT IN AN ORGANIZED HEALTH CARE EDUCATION/TRAINING PROGRAM

## 2024-01-29 PROCEDURE — 7100000000 HC PACU RECOVERY - FIRST 15 MIN: Performed by: STUDENT IN AN ORGANIZED HEALTH CARE EDUCATION/TRAINING PROGRAM

## 2024-01-29 DEVICE — ANCHOR SFT TISS BICEPS FIBERTAK: Type: IMPLANTABLE DEVICE | Site: SHOULDER | Status: FUNCTIONAL

## 2024-01-29 DEVICE — IMPLANTABLE DEVICE: Type: IMPLANTABLE DEVICE | Site: SHOULDER | Status: FUNCTIONAL

## 2024-01-29 RX ORDER — BUPIVACAINE HYDROCHLORIDE 2.5 MG/ML
INJECTION, SOLUTION EPIDURAL; INFILTRATION; INTRACAUDAL
Status: DISCONTINUED
Start: 2024-01-29 | End: 2024-01-29 | Stop reason: HOSPADM

## 2024-01-29 RX ORDER — DIPHENHYDRAMINE HYDROCHLORIDE 50 MG/ML
12.5 INJECTION INTRAMUSCULAR; INTRAVENOUS
Status: DISCONTINUED | OUTPATIENT
Start: 2024-01-29 | End: 2024-01-29 | Stop reason: HOSPADM

## 2024-01-29 RX ORDER — MIDAZOLAM HYDROCHLORIDE 1 MG/ML
INJECTION INTRAMUSCULAR; INTRAVENOUS
Status: COMPLETED
Start: 2024-01-29 | End: 2024-01-29

## 2024-01-29 RX ORDER — SODIUM CHLORIDE 9 MG/ML
INJECTION, SOLUTION INTRAVENOUS PRN
Status: DISCONTINUED | OUTPATIENT
Start: 2024-01-29 | End: 2024-01-29 | Stop reason: HOSPADM

## 2024-01-29 RX ORDER — ONDANSETRON 2 MG/ML
4 INJECTION INTRAMUSCULAR; INTRAVENOUS
Status: DISCONTINUED | OUTPATIENT
Start: 2024-01-29 | End: 2024-01-29 | Stop reason: HOSPADM

## 2024-01-29 RX ORDER — MIDAZOLAM HYDROCHLORIDE 1 MG/ML
INJECTION INTRAMUSCULAR; INTRAVENOUS PRN
Status: DISCONTINUED | OUTPATIENT
Start: 2024-01-29 | End: 2024-01-29 | Stop reason: SDUPTHER

## 2024-01-29 RX ORDER — LABETALOL HYDROCHLORIDE 5 MG/ML
5 INJECTION, SOLUTION INTRAVENOUS EVERY 10 MIN PRN
Status: DISCONTINUED | OUTPATIENT
Start: 2024-01-29 | End: 2024-01-29 | Stop reason: HOSPADM

## 2024-01-29 RX ORDER — OXYCODONE HYDROCHLORIDE 5 MG/1
5 TABLET ORAL PRN
Status: DISCONTINUED | OUTPATIENT
Start: 2024-01-29 | End: 2024-01-29 | Stop reason: HOSPADM

## 2024-01-29 RX ORDER — TRANEXAMIC ACID 100 MG/ML
INJECTION, SOLUTION INTRAVENOUS PRN
Status: DISCONTINUED | OUTPATIENT
Start: 2024-01-29 | End: 2024-01-29 | Stop reason: SDUPTHER

## 2024-01-29 RX ORDER — SODIUM CHLORIDE 0.9 % (FLUSH) 0.9 %
5-40 SYRINGE (ML) INJECTION EVERY 12 HOURS SCHEDULED
Status: DISCONTINUED | OUTPATIENT
Start: 2024-01-29 | End: 2024-01-29 | Stop reason: HOSPADM

## 2024-01-29 RX ORDER — SODIUM CHLORIDE 0.9 % (FLUSH) 0.9 %
5-40 SYRINGE (ML) INJECTION PRN
Status: DISCONTINUED | OUTPATIENT
Start: 2024-01-29 | End: 2024-01-29 | Stop reason: HOSPADM

## 2024-01-29 RX ORDER — LIDOCAINE HYDROCHLORIDE 10 MG/ML
INJECTION, SOLUTION INFILTRATION; PERINEURAL
Status: DISCONTINUED
Start: 2024-01-29 | End: 2024-01-29 | Stop reason: HOSPADM

## 2024-01-29 RX ORDER — MEPERIDINE HYDROCHLORIDE 25 MG/ML
12.5 INJECTION INTRAMUSCULAR; INTRAVENOUS; SUBCUTANEOUS EVERY 5 MIN PRN
Status: DISCONTINUED | OUTPATIENT
Start: 2024-01-29 | End: 2024-01-29 | Stop reason: HOSPADM

## 2024-01-29 RX ORDER — LIDOCAINE HYDROCHLORIDE 20 MG/ML
INJECTION, SOLUTION INFILTRATION; PERINEURAL PRN
Status: DISCONTINUED | OUTPATIENT
Start: 2024-01-29 | End: 2024-01-29 | Stop reason: SDUPTHER

## 2024-01-29 RX ORDER — LIDOCAINE HYDROCHLORIDE 10 MG/ML
1 INJECTION, SOLUTION EPIDURAL; INFILTRATION; INTRACAUDAL; PERINEURAL
Status: DISCONTINUED | OUTPATIENT
Start: 2024-01-29 | End: 2024-01-29 | Stop reason: HOSPADM

## 2024-01-29 RX ORDER — BUPIVACAINE HYDROCHLORIDE 2.5 MG/ML
INJECTION, SOLUTION INFILTRATION; PERINEURAL PRN
Status: DISCONTINUED | OUTPATIENT
Start: 2024-01-29 | End: 2024-01-29 | Stop reason: ALTCHOICE

## 2024-01-29 RX ORDER — ROCURONIUM BROMIDE 10 MG/ML
INJECTION, SOLUTION INTRAVENOUS PRN
Status: DISCONTINUED | OUTPATIENT
Start: 2024-01-29 | End: 2024-01-29 | Stop reason: SDUPTHER

## 2024-01-29 RX ORDER — POLYETHYLENE GLYCOL 3350 17 G/17G
17 POWDER, FOR SOLUTION ORAL DAILY
Qty: 510 G | Refills: 0 | Status: SHIPPED | OUTPATIENT
Start: 2024-01-29 | End: 2024-02-28

## 2024-01-29 RX ORDER — PROPOFOL 10 MG/ML
INJECTION, EMULSION INTRAVENOUS PRN
Status: DISCONTINUED | OUTPATIENT
Start: 2024-01-29 | End: 2024-01-29 | Stop reason: SDUPTHER

## 2024-01-29 RX ORDER — BUPIVACAINE HYDROCHLORIDE 5 MG/ML
INJECTION, SOLUTION EPIDURAL; INTRACAUDAL
Status: DISCONTINUED
Start: 2024-01-29 | End: 2024-01-29 | Stop reason: HOSPADM

## 2024-01-29 RX ORDER — OXYCODONE HYDROCHLORIDE AND ACETAMINOPHEN 5; 325 MG/1; MG/1
1 TABLET ORAL EVERY 6 HOURS PRN
Qty: 28 TABLET | Refills: 0 | Status: SHIPPED | OUTPATIENT
Start: 2024-01-29 | End: 2024-02-05

## 2024-01-29 RX ORDER — OXYCODONE HYDROCHLORIDE 5 MG/1
10 TABLET ORAL PRN
Status: DISCONTINUED | OUTPATIENT
Start: 2024-01-29 | End: 2024-01-29 | Stop reason: HOSPADM

## 2024-01-29 RX ORDER — SODIUM CHLORIDE, SODIUM LACTATE, POTASSIUM CHLORIDE, CALCIUM CHLORIDE 600; 310; 30; 20 MG/100ML; MG/100ML; MG/100ML; MG/100ML
INJECTION, SOLUTION INTRAVENOUS CONTINUOUS
Status: DISCONTINUED | OUTPATIENT
Start: 2024-01-29 | End: 2024-01-29 | Stop reason: HOSPADM

## 2024-01-29 RX ORDER — TRANEXAMIC ACID 100 MG/ML
INJECTION, SOLUTION INTRAVENOUS
Status: COMPLETED
Start: 2024-01-29 | End: 2024-01-29

## 2024-01-29 RX ORDER — ONDANSETRON 4 MG/1
4 TABLET, FILM COATED ORAL 3 TIMES DAILY PRN
Qty: 15 TABLET | Refills: 0 | Status: SHIPPED | OUTPATIENT
Start: 2024-01-29

## 2024-01-29 RX ORDER — EPINEPHRINE 1 MG/ML
INJECTION, SOLUTION INTRAMUSCULAR; SUBCUTANEOUS
Status: DISCONTINUED
Start: 2024-01-29 | End: 2024-01-29 | Stop reason: HOSPADM

## 2024-01-29 RX ADMIN — ROCURONIUM BROMIDE 40 MG: 10 INJECTION, SOLUTION INTRAVENOUS at 08:14

## 2024-01-29 RX ADMIN — CEFAZOLIN 2000 MG: 2 INJECTION, POWDER, FOR SOLUTION INTRAMUSCULAR; INTRAVENOUS at 08:16

## 2024-01-29 RX ADMIN — LIDOCAINE HYDROCHLORIDE 50 MG: 20 INJECTION, SOLUTION INFILTRATION; PERINEURAL at 08:14

## 2024-01-29 RX ADMIN — MIDAZOLAM 2 MG: 1 INJECTION INTRAMUSCULAR; INTRAVENOUS at 07:40

## 2024-01-29 RX ADMIN — TRANEXAMIC ACID 1000 MG: 100 INJECTION, SOLUTION INTRAVENOUS at 08:27

## 2024-01-29 RX ADMIN — PROPOFOL 200 MG: 10 INJECTION, EMULSION INTRAVENOUS at 08:14

## 2024-01-29 RX ADMIN — SUGAMMADEX 200 MG: 100 INJECTION, SOLUTION INTRAVENOUS at 09:58

## 2024-01-29 RX ADMIN — SODIUM CHLORIDE, POTASSIUM CHLORIDE, SODIUM LACTATE AND CALCIUM CHLORIDE: 600; 310; 30; 20 INJECTION, SOLUTION INTRAVENOUS at 08:09

## 2024-01-29 ASSESSMENT — PAIN - FUNCTIONAL ASSESSMENT
PAIN_FUNCTIONAL_ASSESSMENT: NONE - DENIES PAIN
PAIN_FUNCTIONAL_ASSESSMENT: NONE - DENIES PAIN
PAIN_FUNCTIONAL_ASSESSMENT: PREVENTS OR INTERFERES SOME ACTIVE ACTIVITIES AND ADLS
PAIN_FUNCTIONAL_ASSESSMENT: NONE - DENIES PAIN
PAIN_FUNCTIONAL_ASSESSMENT: 0-10
PAIN_FUNCTIONAL_ASSESSMENT: NONE - DENIES PAIN

## 2024-01-29 ASSESSMENT — PAIN DESCRIPTION - DESCRIPTORS: DESCRIPTORS: DISCOMFORT;SORE

## 2024-01-29 NOTE — PROGRESS NOTES
Pt. checked in for procedure. Assessment complete. IV started. Safety check list complete. Family at patient's side.

## 2024-01-29 NOTE — BRIEF OP NOTE
Brief Postoperative Note      Patient: Juan Miguel Reyes  YOB: 1970  MRN: [id number]    Date of Procedure: 1/29/2024    Pre-Op Diagnosis Codes:     * Traumatic incomplete tear of right rotator cuff, initial encounter [S46.011A]    Post-Op Diagnosis:      1. R shoulder high grade partial thickness rotator cuff tear  2. R shoulder biceps tendinitis      Procedure(s):  RIGHT SHOULDER VIDEO ARTHROSCOPY, ROTATOR CUFF REPAIR SUPRASPINATUS MEDIUM, SUBPECTORAL BICEPS TENODESIS    Surgeon(s):  Gumaro Birch DO    Assistant:  Surgical Assistant: Alecia Godoy    Anesthesia: General regional and local    Estimated Blood Loss (mL): Minimal    Complications: None    Specimens:   * No specimens in log *    Implants:  Implant Name Type Inv. Item Serial No.  Lot No. LRB No. Used Action   KL FBTK RC WITH TING FT  - [id number]  KL FBTK RC WITH TING FT   [employer] [id number] Right 1 Implanted   KL FBTK RC [id number] - [id number]  KL FBTK RC [id number]  [employer] [id number] Right 1 Implanted   BioComposite Knotless SwiveLock [place], Self punching with Blue #2 Suture Shoulder/Arm/Wrist/Hand   [employer]-[facility] [id number] Right 2 Implanted   ANCHOR SFT TISS BICEPS FIBERTAK - [id number]  ANCHOR SFT TISS BICEPS FIBERTAK  [employer] [id number] Right 1 Implanted   Implants: Arthrex 2.6 fibertak x 2, 4.75 peek self punching swivelock x 2, 1.9 fibertak        Findings: see op note      Electronically signed by Gumaro Birch DO on 1/29/2024 at 9:47 AM

## 2024-01-29 NOTE — ANESTHESIA PRE PROCEDURE
Department of Anesthesiology  Preprocedure Note       Name:  Juan Miguel Reyes   Age:  53 y.o.  :  1970                                          MRN:  [id number]         Date:  2024      Surgeon: Surgeon(s):  uGmaro Birch DO    Procedure: Procedure(s):  RIGHT SHOULDER VIDEO ARTHROSCOPY, ROTATOR CUFF REPAIR SUBPECTORAL BICEPS TENODESIS  --BLOCK    Medications prior to admission:   Prior to Admission medications    Not on File       Current medications:    Current Facility-Administered Medications   Medication Dose Route Frequency Provider Last Rate Last Admin    ceFAZolin (ANCEF) 2,000 mg in sodium chloride 0.9 % 50 mL IVPB (mini-bag)  2,000 mg IntraVENous Once Gumaro Birch DO        lidocaine PF 1 % injection 1 mL  1 mL IntraDERmal Once PRN Tom Antunez MD        lactated ringers IV soln infusion   IntraVENous Continuous Tom Antunez MD        sodium chloride flush 0.9 % injection 5-40 mL  5-40 mL IntraVENous 2 times per day Tom Antunez MD        sodium chloride flush 0.9 % injection 5-40 mL  5-40 mL IntraVENous PRN Tom Antunez MD        0.9 % sodium chloride infusion   IntraVENous PRN Tom Antunez MD           Allergies:    Allergies   Allergen Reactions    Pcn [Penicillins]      \"I was told that I was deathly allergic\"      Tolerates amoxicillin (Macario Boyer, APRN-CNP)       Problem List:  There is no problem list on file for this patient.      Past Medical History:        Diagnosis Date    MRSA (methicillin resistant staph aureus) culture positive 2011    Finger positive for MRSA    Recurrent otitis media of both ears     Smoking history        Past Surgical History:        Procedure Laterality Date    FRACTURE SURGERY Left     ORTHOPEDIC SURGERY      L elbow    TYMPANOSTOMY TUBE PLACEMENT      15 tubes in right, 16 tubes in left over the years       Social History:    Social History     Tobacco Use    Smoking status: Every Day     Current

## 2024-01-29 NOTE — H&P
Orthopedic Preoperative Note      CHIEF COMPLAINT:  R shoulder pain    HISTORY OF PRESENT ILLNESS:      The patient is a 53 y.o. male with R shoulder pain and MRI findings of high grade partial cuff tear.    Past Medical History:    Past Medical History:   Diagnosis Date    MRSA (methicillin resistant staph aureus) culture positive 09/09/2011    Finger positive for MRSA    Recurrent otitis media of both ears     Smoking history        Past Surgical History:    Past Surgical History:   Procedure Laterality Date    FRACTURE SURGERY Left     ORTHOPEDIC SURGERY      L elbow    TYMPANOSTOMY TUBE PLACEMENT      15 tubes in right, 16 tubes in left over the years       Medications Prior to Admission:   Prior to Admission medications    Not on File       Allergies:    Pcn [penicillins]    Social History:   Social History     Socioeconomic History    Marital status: Legally      Spouse name: None    Number of children: None    Years of education: None    Highest education level: None   Tobacco Use    Smoking status: Every Day     Current packs/day: 1.00     Average packs/day: 1 pack/day for 36.1 years (36.1 ttl pk-yrs)     Types: Cigarettes     Start date: 1/1/1988    Smokeless tobacco: Former     Types: Chew   Vaping Use    Vaping Use: Never used   Substance and Sexual Activity    Alcohol use: Yes     Alcohol/week: 14.0 standard drinks of alcohol     Types: 14 Cans of beer per week     Comment: 3 beers min. daily    Drug use: No    Sexual activity: Yes     Partners: Female     Social Determinants of Health     Financial Resource Strain: Low Risk  (6/6/2023)    Overall Financial Resource Strain (CARDIA)     Difficulty of Paying Living Expenses: Not hard at all   Transportation Needs: Unknown (9/27/2023)    PRAPARE - Transportation     Lack of Transportation (Non-Medical): No   Housing Stability: Unknown (9/27/2023)    Housing Stability Vital Sign     Unstable Housing in the Last Year: No       Family History:  Family

## 2024-01-29 NOTE — OP NOTE
Operative Note      Patient: Juan Miguel Reyes  YOB: 1970  MRN: [id number]    Date of Procedure: 1/29/2024    Pre-Op Diagnosis Codes:     * Traumatic incomplete tear of right rotator cuff, initial encounter [S46.011A]    Post-Op Diagnosis:    1. R shoulder high grade partial thickness rotator cuff tear  2. R shoulder biceps tendinitis      Procedure(s):  RIGHT SHOULDER VIDEO ARTHROSCOPY, ROTATOR CUFF REPAIR SUPRASPINATUS MEDIUM, SUBPECTORAL BICEPS TENODESIS    Surgeon(s):  Gumaro Birch DO    Assistant:  Surgical Assistant: Alecia Godoy    Anesthesia: General regional and local    Estimated Blood Loss (mL): Minimal    Complications: None    Specimens:   * No specimens in log *    Implants:  Implant Name Type Inv. Item Serial No.  Lot No. LRB No. Used Action   KL FBTK RC WITH TING FT  - [id number]  KL FBTK RC WITH TING FT   [employer] [id number] Right 1 Implanted   KL FBTK RC [id number] - [id number]  KL FBTK RC [id number]  [employer] [id number] Right 1 Implanted   BioComposite Knotless SwiveLock [place], Self punching with Blue #2 Suture Shoulder/Arm/Wrist/Hand   [employer] [facility] [id number] Right 2 Implanted   ANCHOR SFT TISS BICEPS FIBERTAK - [id number]  ANCHOR SFT TISS BICEPS FIBERTAK  [employer] [id number] Right 1 Implanted   Implants: Arthrex 2.6 fibertak x 2, 4.75 peek self punching swivelock x 2, 1.9 fibertak        Detailed Description of Procedure:   The patient is a 53M presenting with R shoulder pain and weakness.  Physical exam and MRI demonstrated a high grade partial thickness cuff tear.  Operative indications were met for R shoulder arthroscopic rotator cuff repair with biceps tenotomy versus tenodesis.     Operative summary:  The patient was met in the preoperative area and the appropriate surgical site was marked.  Written consent was obtained after discussing the risks, benefits, and alternative treatment options with the patient in detail.  The patient agreed to move

## 2024-01-29 NOTE — PROGRESS NOTES
Patient on PACU stretcher and placed on cardiac monitor for nerve block. Time Out done at bedside with anesthesiologist. Right interscalene nerve block completed per Dr. Stewart with ultrasound guidance. Pt tolerated well. Vitals stable.

## 2024-01-29 NOTE — DISCHARGE INSTRUCTIONS
Gumaro Birch, DO                       Discharge Instructions     Weight bearing status: Non weight bearing for the right arm  Keep dressing clean and dry.  Starting 3 days after surgery, Ok for daily dressing changes until wound is dry. Then leave open to air.  Dress with plastic bag and rubber band to seal and repeat with second bag and rubber band when showering. \"Press & Seal\" wrap also works for sealing the rubber bag when showering until wound is clean, dry, and no longer draining.  Remove sling several times a day for elbow and wrist motion to prevent stiffness in those areas. Do NOT remove sling any other time, even to sleep, to protect repair.  While out of sling, ok for gentle shoulder pendulum and Codmans exercises starting after you see me in the office, we will talk about those.  Ice (20 minutes on and off 1 hour) and elevate as needed to reduce swelling and throbbing pain.  Starting 3 days after surgery, if wound is no longer leaking, Ok to shower but no soaks or baths for 3 weeks.  Advance diet as tolerated: begin with clear liquids.  Drink plenty of fluids.  Should urinate within 8 hours of surgery.  Call the office or come to Emergency Room if signs of infection appear (hot, swollen, red, draining pus, fever)  Take medications as prescribed.  Wean off narcotics (percocet/norco) as soon as possible. Do not take tylenol if still taking narcotics.  Follow up with Dr. Birch 7-10 days after surgery. Call his office to schedule.  PERIPHERAL NERVE BLOCK INSTRUCTIONS     Please remember while having a nerve block you are at an increased risk for BALANCE ISSUES AND FALLS!!  You were given a nerve block today from the anesthesiologist. Most nerve blocks last anywhere from 6-36 hours.  You should start taking your pain medication before the block wears off or when you first begin feeling discomfort. It takes at least 30-60 minutes for a pain pill to take effect. Pain medications should be taken

## 2024-01-29 NOTE — ANESTHESIA PROCEDURE NOTES
Peripheral Block    Patient location during procedure: pre-op  Reason for block: post-op pain management and at surgeon's request  Start time: 1/29/2024 7:34 AM  End time: 1/29/2024 7:36 AM  Staffing  Performed: anesthesiologist   Anesthesiologist: Audie Stewart MD  Performed by: Audie Stewart MD  Authorized by: Audie Stewart MD    Preanesthetic Checklist  Completed: patient identified, IV checked, site marked, risks and benefits discussed, surgical/procedural consents, equipment checked, pre-op evaluation, timeout performed, anesthesia consent given, oxygen available and monitors applied/VS acknowledged  Peripheral Block   Patient position: sitting  Prep: ChloraPrep  Provider prep: mask and sterile gloves  Patient monitoring: cardiac monitor, continuous pulse ox, frequent blood pressure checks and IV access  Block type: Brachial plexus  Interscalene  Laterality: right  Injection technique: single-shot  Guidance: ultrasound guided  Local infiltration: lidocaine  Infiltration strength: 1 %  Local infiltration: lidocaine  Dose: 3 mL    Needle   Needle gauge: 21 G  Needle localization: ultrasound guidance  Needle length: 10 cm  Assessment   Injection assessment: negative aspiration for heme, no paresthesia on injection and local visualized surrounding nerve on ultrasound  Paresthesia pain: none  Slow fractionated injection: yes  Hemodynamics: stable  Real-time US image taken/store: yes  Outcomes: uncomplicated and patient tolerated procedure well    Additional Notes  Immediately prior to procedure a \"time out\" was called to verify the correct patient, allergies, laterality, procedure and equipment. Time out performed with  RN    Local Anesthetic: 0.5 %  Bupivacaine   Amount: 20 ml  in 5 ml increments after negative aspiration each time.    Anterior scalene and middle scalene muscles, upper, middle and lower trunks of the brachial plexus are identified, the tip of the needle and the spread of the local anesthetic

## 2024-01-29 NOTE — ANESTHESIA POSTPROCEDURE EVALUATION
Department of Anesthesiology  Postprocedure Note    Patient: Juan Miguel Reyes  MRN: [id number]  YOB: 1970  Date of evaluation: 1/29/2024    Procedure Summary       Date: 01/29/24 Room / Location: [address]    Anesthesia Start: 0809 Anesthesia Stop: 1007    Procedure: RIGHT SHOULDER VIDEO ARTHROSCOPY, ROTATOR CUFF REPAIR SUBPECTORAL BICEPS TENODESIS (Right: Shoulder) Diagnosis:       Traumatic incomplete tear of right rotator cuff, initial encounter      (Traumatic incomplete tear of right rotator cuff, initial encounter [S46.011A])    Surgeons: Gumaro Birch DO Responsible Provider: Audie Stewart MD    Anesthesia Type: general ASA Status: 2            Anesthesia Type: No value filed.    Parviz Phase I: Parviz Score: 9    Parviz Phase II: Parviz Score: 10    Anesthesia Post Evaluation    Comments: Postoperative Anesthesia Note    Name:    Juan Miguel Reyes  MRN:      [id number]    Patient Vitals in the past 12 hrs:  01/29/24 1100, BP:129/88, Pulse:68, Resp:18, SpO2:99 %  01/29/24 1045, BP:120/80, Temp:97.6 °F (36.4 °C), Temp src:Infrared, Pulse:68, Resp:18, SpO2:97 %  01/29/24 1033, BP:118/77, Pulse:78, Resp:18, SpO2:99 %  01/29/24 1018, BP:118/77, Pulse:79, Resp:18, SpO2:100 %  01/29/24 1013, BP:124/70, Pulse:79, Resp:20, SpO2:99 %  01/29/24 1008, BP:120/70, Temp:98 °F (36.7 °C), Temp src:Infrared, Pulse:81, Resp:18, SpO2:96 %  01/29/24 0747, BP:130/76, Pulse:71, Resp:18, SpO2:95 %  01/29/24 0742, BP:111/83, Pulse:70, Resp:20, SpO2:95 %  01/29/24 0737, BP:117/84, Pulse:67, Resp:20, SpO2:94 %  01/29/24 0651, BP:(!) 129/92, Temp:97.7 °F (36.5 °C), Temp src:Temporal, Pulse:78, Resp:19, SpO2:98 %, Height:1.829 m (6'), Weight:83 kg (183 lb)     LABS:    CBC  Lab Results       Component                Value               Date/Time                  WBC                      6.9                 01/17/2024 08:00 AM        HGB                      15.5                01/17/2024

## 2024-02-08 ENCOUNTER — OFFICE VISIT (OUTPATIENT)
Dept: ORTHOPEDIC SURGERY | Age: 54
End: 2024-02-08

## 2024-02-08 VITALS — HEIGHT: 72 IN | BODY MASS INDEX: 24.79 KG/M2 | WEIGHT: 183 LBS

## 2024-02-08 DIAGNOSIS — S46.011A TRAUMATIC INCOMPLETE TEAR OF RIGHT ROTATOR CUFF, INITIAL ENCOUNTER: Primary | ICD-10-CM

## 2024-02-08 PROCEDURE — 99024 POSTOP FOLLOW-UP VISIT: CPT | Performed by: STUDENT IN AN ORGANIZED HEALTH CARE EDUCATION/TRAINING PROGRAM

## 2024-02-08 RX ORDER — OXYCODONE HYDROCHLORIDE AND ACETAMINOPHEN 5; 325 MG/1; MG/1
1 TABLET ORAL EVERY 6 HOURS PRN
Qty: 28 TABLET | Refills: 0 | Status: SHIPPED | OUTPATIENT
Start: 2024-02-08 | End: 2024-02-15

## 2024-02-08 NOTE — PROGRESS NOTES
Chief Complaint  Shoulder Pain (RIGHT SHOULDER PAIN.)      History of Present Illness:  Juan Miguel Reyes is a pleasant 53 y.o. male here today for his first postop evaluation regarding his right shoulder.  He is10 days status post right shoulder arthroscopy with rotator cuff repair of the supraspinatus and subpectoral biceps tenodesis, date of procedure 1/29/2024. Pain controlled.         Medical History:  Patient's medications, allergies, past medical, surgical, social and family histories were reviewed and updated as appropriate.    Pertinent items are noted in HPI  Review of systems reviewed from Patient History Form available in the patient's chart under the Media tab.       Vital Signs:  There were no vitals filed for this visit.      Constitutional: In no apparent distress. Normal affect. Alert and oriented X3 and is cooperative.       Right shoulder exam    Well-healing surgical incisions to the shoulder.  No signs of infection or drainage.  Appropriately tender to palpation.  Compartments soft.  Ulnar/Median/AIN/PIN motor intact.  C4-T1 sensation grossly intact.  Radial pulse 2+ with BCR        Radiology:       No new x-rays today.         Assessment : 53-year-old male 10 days status post right shoulder arthroscopy with rotator cuff repair of the supraspinatus and subpectoral biceps tenodesis, date of procedure 1/29/2024    Impression:  Encounter Diagnosis   Name Primary?    Traumatic incomplete tear of right rotator cuff, initial encounter Yes       Office Procedures:  No orders of the defined types were placed in this encounter.        Plan:     The patient overall is doing well.  I will get him set up with physical therapy at [place] for a medium rotator cuff repair protocol.  Forward flexion to 110 degrees, external rotation to 30 degrees, avoid behind the back for now.  Okay for biceps and triceps work.  Okay for shoulder shrugs as well as scapular strengthening.  Follow-up in 2 weeks for recheck    .

## 2024-02-13 ENCOUNTER — HOSPITAL ENCOUNTER (OUTPATIENT)
Dept: PHYSICAL THERAPY | Age: 54
Setting detail: THERAPIES SERIES
Discharge: HOME | End: 2024-02-13
Payer: COMMERCIAL

## 2024-02-13 DIAGNOSIS — M25.511 ACUTE PAIN OF RIGHT SHOULDER: Primary | ICD-10-CM

## 2024-02-13 DIAGNOSIS — M62.81 MUSCLE WEAKNESS OF RIGHT UPPER EXTREMITY: ICD-10-CM

## 2024-02-13 PROCEDURE — 97161 PT EVAL LOW COMPLEX 20 MIN: CPT

## 2024-02-13 PROCEDURE — 97110 THERAPEUTIC EXERCISES: CPT

## 2024-02-13 PROCEDURE — 97140 MANUAL THERAPY 1/> REGIONS: CPT

## 2024-02-13 NOTE — PLAN OF CARE
Congenital spine pathologies   [] Osteoporosis (M81.8)  [] Osteopenia (M85.8)  [] Scoliosis    Cardio/Pulmonary conditions  [] Asthma (J45)  [] Coughing   [] COPD (J44.9)  [] CHF  [] A-fib          Rheumatological conditions  [] Fibromyalgia (M79.7)  [] Lupus  [] Sjogrens  [] Ankylosing spondylitis  [] Other autoimmune       Metabolic conditions  [] Morbid obesity (E66.01)  [] Diabetes type 1(E10.65) or 2 (E11.65)   [] Neuropathy (G60.9)        Cardiovascular conditions  [] Hypertension (I10)  [] Hyperlipidemia (E78.5)  [] Angina pectoris (I20)  [] Atherosclerosis (I70)  [] Pacemaker/Defib  [] Hx of CABG/stent/cardiac surgeries        Developmental Disorders  [] Autism (F84.0)  [] Cerebral Palsy (G80)  [] Down Syndrome (Q90.9)  [] Developmental delay     Psychological Disorders  [] Anxiety (F41.9)  [] Depression (F32.9)   [] Bipolar  [] Other:      Prior surgeries  [] Involved limb  [] Previous spinal surgery  []  section birth  [] Hysterectomy  [] Bowel / bladder surgery  [] Other relevant surgeries        Other conditions  [] Vertigo  [] Syncope  [] Kidney Failure  [] Cancer  [] Pregnancy  [] Incontinence   Other Co-morbidities not listed: Hard of Hearing   ASSESSMENT   Assessment:   Patient presents today to Outpatient PT with signs and symptoms consistent with s/p R RTC repair and bicep tenodesis.   Patient presents with the functional impairments and activity limitations listed below and would benefit from continued Outpatient PT to address the below impairments as well as improve pain, and restore function.     Functional Impairments:   Noted spinal or UE joint hypomobility  Decreased UE functional ROM  Decreased UE functional strength    Functional Activity Limitations (from functional questionnaire and intake):  Your usual hobbies, recreational or sporting activities, Lifting a bag of groceries to waist level, Lifting a bag of groceries above your head, Grooming your hair, Pushing up on your hands

## 2024-02-13 NOTE — FLOWSHEET NOTE
[facility]- Outpatient Rehabilitation and Therapy [address], [place] [address] office: [phone] fax: [phone]      Physical Therapy: TREATMENT/PROGRESS NOTE   Patient: Juan Miguel Reyes (53 y.o. male)   Treatment Date: 2024   :  1970 MRN: [id number]   Visit #: 1   Insurance Allowable Auth Needed   Auth thru [employer]  Check [place] [x]Yes    []No    Insurance: Payor: BCBS / Plan: BCBS - [place] PPO / Product Type: *No Product type* /   Insurance ID: [id number] - ([facility])  Secondary Insurance (if applicable):    Treatment Diagnosis:     ICD-10-CM    1. Acute pain of right shoulder  M25.511       2. Muscle weakness of right upper extremity  M62.81          Medical Diagnosis:    Traumatic incomplete tear of right rotator cuff, initial encounter [S46.011D] s/p R Supraspinatus Repair with Subpectoral Bicep Tenodesis 2024    Referring Physician: Gumaro Birch DO  PCP: Macario Boyer APRN - CNP                             Plan of care signed: NO    Date of Patient follow up with Physician:      Progress Report/POC: EVAL today  POC update due: (10 visits /OR AUTH LIMITS, whichever is less)  3/14/2024     Precautions/ Contra-indications:                                                                                          Latex allergy:  NO  Pacemaker:    NO  Contraindications for Manipulation: recent surgical history (relative)  Date of Surgery: 2024  Other:  Medium Repair Protocol           Preferred Language for Healthcare:   [x]English       []other:    SUBJECTIVE EXAMINATION     Patient Report/Comments: see eval     Test used Initial score  2024   Pain Summary VAS 3/10 current  7/10 at worst    Functional questionnaire Quick DASH 48/55  84%    Other:                OBJECTIVE EXAMINATION     Observation: See Eval    Test measurements: See Eval    Exercises/Interventions:     Therapeutic Ex (83177)  resistance Sets/time Reps Notes/Cues/Progressions

## 2024-02-20 ENCOUNTER — HOSPITAL ENCOUNTER (OUTPATIENT)
Dept: PHYSICAL THERAPY | Age: 54
Setting detail: THERAPIES SERIES
Discharge: HOME | End: 2024-02-20
Payer: COMMERCIAL

## 2024-02-20 PROCEDURE — 97112 NEUROMUSCULAR REEDUCATION: CPT

## 2024-02-20 PROCEDURE — 97110 THERAPEUTIC EXERCISES: CPT

## 2024-02-20 PROCEDURE — 97140 MANUAL THERAPY 1/> REGIONS: CPT

## 2024-02-20 NOTE — FLOWSHEET NOTE
[facility]- Outpatient Rehabilitation and Therapy [address], [place] [address] office: [phone] fax: [phone]      Physical Therapy: TREATMENT/PROGRESS NOTE   Patient: Juan Miguel Reyes (53 y.o. male)   Treatment Date: 2024   :  1970 MRN: [id number]   Visit #: 2   Insurance Allowable Auth Needed   12 visits  () [x]Yes    []No    Insurance: Payor: BC / Plan: [facility] - [place] PPO / Product Type: *No Product type* /   Insurance ID: [id number] - ([facility])  Secondary Insurance (if applicable):    Treatment Diagnosis:     ICD-10-CM    1. Acute pain of right shoulder  M25.511       2. Muscle weakness of right upper extremity  M62.81          Medical Diagnosis:    Traumatic incomplete tear of right rotator cuff, initial encounter [S46.011D] s/p R Supraspinatus Repair with Subpectoral Bicep Tenodesis 2024    Referring Physician: Gumaro Birch DO  PCP: Macario Boyer APRN - CNP                             Plan of care signed: YES    Date of Patient follow up with Physician:      Progress Report/POC: NO  POC update due: (10 visits /OR AUTH LIMITS, whichever is less)  3/14/2024     Precautions/ Contra-indications:                                                                                          Latex allergy:  NO  Pacemaker:    NO  Contraindications for Manipulation: recent surgical history (relative)  Date of Surgery: 2024  Other:  Medium Repair Protocol         Plan:      The patient overall is doing well.  I will get him set up with physical therapy at [place] for a medium rotator cuff repair protocol.  Forward flexion to 110 degrees, external rotation to 30 degrees, avoid behind the back for now.  Okay for biceps and triceps work.  Okay for shoulder shrugs as well as scapular strengthening.  Follow-up in 2 weeks for recheck     . Juan Miguel Reyes is in agreement with this plan. All questions were answered to patient's satisfaction and was

## 2024-02-22 ENCOUNTER — HOSPITAL ENCOUNTER (OUTPATIENT)
Dept: PHYSICAL THERAPY | Age: 54
Setting detail: THERAPIES SERIES
Discharge: HOME | End: 2024-02-22
Payer: COMMERCIAL

## 2024-02-22 PROCEDURE — 97140 MANUAL THERAPY 1/> REGIONS: CPT

## 2024-02-22 PROCEDURE — 97110 THERAPEUTIC EXERCISES: CPT

## 2024-02-22 PROCEDURE — 97112 NEUROMUSCULAR REEDUCATION: CPT

## 2024-02-22 NOTE — FLOWSHEET NOTE
[facility]- Outpatient Rehabilitation and Therapy [address], [place] [address] office: [phone] fax: [phone]      Physical Therapy: TREATMENT/PROGRESS NOTE   Patient: Juan Miguel Reyes (53 y.o. male)   Treatment Date: 2024   :  1970 MRN: [id number]   Visit #: 3   Insurance Allowable Auth Needed   12 visits  () [x]Yes    []No    Insurance: Payor: BC / Plan: [facility] - [place] PPO / Product Type: *No Product type* /   Insurance ID: [id number] - ([facility])  Secondary Insurance (if applicable):    Treatment Diagnosis:     ICD-10-CM    1. Acute pain of right shoulder  M25.511       2. Muscle weakness of right upper extremity  M62.81          Medical Diagnosis:    Traumatic incomplete tear of right rotator cuff, initial encounter [S46.011D] s/p R Supraspinatus Repair with Subpectoral Bicep Tenodesis 2024    Referring Physician: Gumaro Birch DO  PCP: Macario Boyer APRN - CNP                             Plan of care signed: YES    Date of Patient follow up with Physician:      Progress Report/POC: NO  POC update due: (10 visits /OR AUTH LIMITS, whichever is less)  3/14/2024     Precautions/ Contra-indications:                                                                                          Latex allergy:  NO  Pacemaker:    NO  Contraindications for Manipulation: recent surgical history (relative)  Date of Surgery: 2024  Other:  Medium Repair Protocol         Plan:      The patient overall is doing well.  I will get him set up with physical therapy at [place] for a medium rotator cuff repair protocol.  Forward flexion to 110 degrees, external rotation to 30 degrees, avoid behind the back for now.  Okay for biceps and triceps work.  Okay for shoulder shrugs as well as scapular strengthening.  Follow-up in 2 weeks for recheck     . Juan Miguel Reyes is in agreement with this plan. All questions were answered to patient's satisfaction and was

## 2024-02-26 ENCOUNTER — TELEPHONE (OUTPATIENT)
Dept: ORTHOPEDIC SURGERY | Age: 54
End: 2024-02-26

## 2024-02-26 ENCOUNTER — OFFICE VISIT (OUTPATIENT)
Dept: ORTHOPEDIC SURGERY | Age: 54
End: 2024-02-26

## 2024-02-26 VITALS — BODY MASS INDEX: 24.79 KG/M2 | WEIGHT: 183 LBS | HEIGHT: 72 IN

## 2024-02-26 DIAGNOSIS — S46.011A TRAUMATIC INCOMPLETE TEAR OF RIGHT ROTATOR CUFF, INITIAL ENCOUNTER: Primary | ICD-10-CM

## 2024-02-26 PROCEDURE — 99024 POSTOP FOLLOW-UP VISIT: CPT | Performed by: PHYSICIAN ASSISTANT

## 2024-02-26 RX ORDER — OXYCODONE HYDROCHLORIDE AND ACETAMINOPHEN 5; 325 MG/1; MG/1
1 TABLET ORAL EVERY 6 HOURS PRN
Qty: 28 TABLET | Refills: 0 | Status: SHIPPED | OUTPATIENT
Start: 2024-02-26 | End: 2024-03-04

## 2024-02-26 RX ORDER — TIZANIDINE 4 MG/1
4 TABLET ORAL 3 TIMES DAILY PRN
Qty: 30 TABLET | Refills: 0 | Status: SHIPPED | OUTPATIENT
Start: 2024-02-26

## 2024-02-26 NOTE — PROGRESS NOTES
Yes       Office Procedures:  No orders of the defined types were placed in this encounter.        Plan:     Advised for PT per protocol for a medium rotator cuff tear.  Noted subscap protection for 8 weeks.  Noted biceps protection for 6 weeks. Refill pain medicine on an as needed basis. Maintain sling full time. OK to remove several times a day for elbow and wrist motion. Follow up in 2 weeks.  For wound recheck and motion check     Juan Miguel Reyes is in agreement with this plan. All questions were answered to patient's satisfaction and was encouraged to call with any further questions.    LUCIO Calvillo    Orthopedic Surgery and Sports Medicine  2/26/2024      This dictation was performed with a verbal recognition program (DRAGON) and it was checked for errors.  It is possible that there are still dictated errors within this office note.  If so, please bring any errors to my attention for an addendum.  All efforts were made to ensure that this office note is accurate.

## 2024-02-26 NOTE — TELEPHONE ENCOUNTER
General Question     Subject: MEDICATION REQUEST  Patient and /or Facility Request: MICHAEL HOWE  Contact Number: [phone]    PT CALLED STATING HE SAW [facility]IE[facility] VILLANUEVA TODAY, 2 PRESCRIPTIONS WERE SUPPOSED TO BE CALLED IN BUT PHARMACY HASN'T RECEIVED ANYTHING. [facility] [address]    PLEASE CALL PT BACK AT THE ABOVE NUMBER.

## 2024-02-27 ENCOUNTER — HOSPITAL ENCOUNTER (OUTPATIENT)
Dept: PHYSICAL THERAPY | Age: 54
Setting detail: THERAPIES SERIES
Discharge: HOME | End: 2024-02-27
Payer: COMMERCIAL

## 2024-02-27 PROCEDURE — 97110 THERAPEUTIC EXERCISES: CPT

## 2024-02-27 PROCEDURE — 97112 NEUROMUSCULAR REEDUCATION: CPT

## 2024-02-27 PROCEDURE — 97140 MANUAL THERAPY 1/> REGIONS: CPT

## 2024-02-27 NOTE — FLOWSHEET NOTE
encouraged to call with any further questions.     Gumaro Birch, DO  Orthopedic Surgery and Sports Medicine  2/8/2024    Preferred Language for Healthcare:   [x]English       []other:    SUBJECTIVE EXAMINATION     Patient Report/Comments: (4 wks 2/26/2024)Patient soreness and pain in the elbow and front part of shoulder.       Test used Initial score  2/13/24 02/27/2024   Pain Summary VAS 3/10 current  7/10 at worst 5/10 today  8-9/10 at [facility]   Functional questionnaire Quick DASH 48/55  84%    Other: PROM shoulder   Flexion: 90 ° with pain at end range  ER: 25 °              OBJECTIVE EXAMINATION     Observation: Patient holds arm in protective positioning; TC to relax upper trap    Test measurements: See Eval    Exercises/Interventions:     Therapeutic Ex (94377)  resistance Sets/time Reps Notes/Cues/Progressions                 Upper Trap Stretch  1  30\" 3 x     Levator Scap Stretch  1  30\" 3 x            PROM Elbow Flex/Ext  1 20 x            Wrist Flex/Ext  1 20 x ea    Rad/Ulnar Deviation  1 20 x ea    Pro/Sup  1 20 x ea           Ball Squeeze  1  5\" 10 x            Self PROM Shoulder Flex  1  10\" 10 x     Self PROM Shoulder Er  1  10\" 10 x            Supine cane press  1 15 x     Supine Shoulder Flex with hands clasped  1  5\" 10 x     Supine Scap Squeeze  1 20 x            Table Slides Flexion  1  10\" 10 x     Table Slides Scap  1  10\" 10 x            Manual Intervention (49603)  TIME     Jt Mobs (grad 1 and 2)  5'     PROM Shoulder  10'     STM to Bicep muscle belly  5'                   NMR re-education (52004) resistance Sets/time Reps CUES NEEDED   Scap Squeeze  1  5\" 20 x  Tactile and Verbal Cues Required for activation of scapular stabilizers and proper technique   Shrug with Scap Squeeze  1  3\" 20 x  Tactile and Verbal Cues Required for activation of scapular stabilizers and proper technique          SubMax  Iso's (Flex/Abd/Ext/ER/IR) Pain Free   1  5\" 10 x  Tactile and Verbal Cues Required for

## 2024-02-29 ENCOUNTER — HOSPITAL ENCOUNTER (OUTPATIENT)
Dept: PHYSICAL THERAPY | Age: 54
Setting detail: THERAPIES SERIES
Discharge: HOME | End: 2024-02-29
Payer: COMMERCIAL

## 2024-02-29 PROCEDURE — 97112 NEUROMUSCULAR REEDUCATION: CPT

## 2024-02-29 PROCEDURE — 97140 MANUAL THERAPY 1/> REGIONS: CPT

## 2024-02-29 PROCEDURE — 97110 THERAPEUTIC EXERCISES: CPT

## 2024-02-29 NOTE — FLOWSHEET NOTE
(72021)     Ultrasound (02162)    Group Therapy (40427)     Estim Attended (82595)    Canalith Repositioning (21365)     Other:    Other:    Total Timed Code Tx Minutes 55' 4       Total Treatment Minutes 55'        Charge Justification:  (76559) THERAPEUTIC EXERCISE - Provided verbal/tactile cueing for activities related to strengthening, flexibility, endurance, ROM performed to prevent loss of range of motion, maintain or improve muscular strength or increase flexibility, following either an injury or surgery.   (83420) NEUROMUSCULAR RE-EDUCATION - Therapeutic procedure, 1 or more areas, each 15 minutes; neuromuscular reeducation of movement, balance, coordination, kinesthetic sense, posture, and/or proprioception for sitting and/or standing activities  (27496) MANUAL THERAPY -  Manual therapy techniques, 1 or more regions, each 15 minutes (Mobilization/manipulation, manual lymphatic drainage, manual traction) for the purpose of modulating pain, promoting relaxation,  increasing ROM, reducing/eliminating soft tissue swelling/inflammation/restriction, improving soft tissue extensibility and allowing for proper ROM for normal function with self care, mobility, lifting and ambulation    TREATMENT PLAN   Plan: Cont POC- Continue emphasis/focus on exercise progression, improving proper muscle recruitment and activation/motor control patterns, reduce/eliminate soft tissue swelling/inflammation/restriction, and allowing for proper ROM. Next visit plan to progress reps and add new exercises     Electronically Signed by Marry Agrawal PT , MPT,ATC, cert DN             Date: 02/29/2024     Note: If patient does not return for scheduled/recommended follow up visits, this note will serve as a discharge from care along with the most recent update on progress.

## 2024-03-05 ENCOUNTER — HOSPITAL ENCOUNTER (OUTPATIENT)
Dept: PHYSICAL THERAPY | Age: 54
Setting detail: THERAPIES SERIES
End: 2024-03-05
Payer: COMMERCIAL

## 2024-03-07 ENCOUNTER — HOSPITAL ENCOUNTER (OUTPATIENT)
Dept: PHYSICAL THERAPY | Age: 54
Setting detail: THERAPIES SERIES
Discharge: HOME | End: 2024-03-07
Payer: COMMERCIAL

## 2024-03-07 PROCEDURE — 97112 NEUROMUSCULAR REEDUCATION: CPT | Performed by: PHYSICAL THERAPY ASSISTANT

## 2024-03-07 PROCEDURE — 97110 THERAPEUTIC EXERCISES: CPT | Performed by: PHYSICAL THERAPY ASSISTANT

## 2024-03-07 PROCEDURE — 97140 MANUAL THERAPY 1/> REGIONS: CPT | Performed by: PHYSICAL THERAPY ASSISTANT

## 2024-03-07 NOTE — FLOWSHEET NOTE
[facility]- Outpatient Rehabilitation and Therapy [address], [place] [address] office: [phone] fax: [phone]      Physical Therapy: TREATMENT/PROGRESS NOTE   Patient: Juan Miguel Reyes (53 y.o. male)   Treatment Date: 2024   :  1970 MRN: [id number]   Visit #: 6   Insurance Allowable Auth Needed   12 visits  () [x]Yes    []No    Insurance: Payor: BC / Plan: [facility] - [place] PPO / Product Type: *No Product type* /   Insurance ID: [id number] - ([facility])  Secondary Insurance (if applicable):    Treatment Diagnosis:     ICD-10-CM    1. Acute pain of right shoulder  M25.511       2. Muscle weakness of right upper extremity  M62.81          Medical Diagnosis:    Traumatic incomplete tear of right rotator cuff, initial encounter [S46.011D] s/p R Supraspinatus Repair with Subpectoral Bicep Tenodesis 2024    Referring Physician: Gumaro Birch DO  PCP: Macario Boyer APRN - CNP                             Plan of care signed: YES    Date of Patient follow up with Physician:      Progress Report/POC: NO  POC update due: (10 visits /OR AUTH LIMITS, whichever is less)  3/14/2024     Precautions/ Contra-indications:                                                                                          Latex allergy:  NO  Pacemaker:    NO  Contraindications for Manipulation: recent surgical history (relative)  Date of Surgery: 2024  Other:  Medium Repair Protocol         Plan:      The patient overall is doing well.  I will get him set up with physical therapy at [place] for a medium rotator cuff repair protocol.  Forward flexion to 110 degrees, external rotation to 30 degrees, avoid behind the back for now.  Okay for biceps and triceps work.  Okay for shoulder shrugs as well as scapular strengthening.  Follow-up in 2 weeks for recheck     . Juan Miguel Reyes is in agreement with this plan. All questions were answered to patient's satisfaction and was

## 2024-03-12 ENCOUNTER — HOSPITAL ENCOUNTER (OUTPATIENT)
Dept: PHYSICAL THERAPY | Age: 54
Setting detail: THERAPIES SERIES
Discharge: HOME | End: 2024-03-12
Payer: COMMERCIAL

## 2024-03-12 PROCEDURE — 97140 MANUAL THERAPY 1/> REGIONS: CPT | Performed by: PHYSICAL THERAPY ASSISTANT

## 2024-03-12 PROCEDURE — 97112 NEUROMUSCULAR REEDUCATION: CPT | Performed by: PHYSICAL THERAPY ASSISTANT

## 2024-03-12 PROCEDURE — 97110 THERAPEUTIC EXERCISES: CPT | Performed by: PHYSICAL THERAPY ASSISTANT

## 2024-03-12 NOTE — PLAN OF CARE
[facility]- Outpatient Rehabilitation and Therapy [address], [place] [address] office: [phone] fax: [phone]    Physical Therapy Re-Certification Plan of Care    Dear Gumaro Birch DO  ,    We had the pleasure of treating the following patient for physical therapy services at [facility]. A summary of our findings can be found in the updated assessment below.  This includes our plan of care.  If you have any questions or concerns regarding these findings, please do not hesitate to contact me at the office phone number checked above.  Thank you for the referral.     Physician Signature:________________________________Date:__________________  By signing above (or electronic signature), therapist's plan is approved by physician      Functional Outcome: Quick Dash:  36/55 - 57%  Juan Miguel Reyes 1970 continues to present with functional deficits in ROM, joint mobility, strength symmetry, and endurance of strength  limiting ability with reaching overhead, carrying items, lifting items, pushing or pulling activity, don/doff shoes/socks, ADLs, and light home activity .  During therapy this date, patient required visual cueing, verbal cueing, and tactile cueing for exercise progression and increasing ROM. Patient will continue to benefit from ongoing evaluation and advanced clinical decision from a Physical Therapist to improve pain control, ROM, and muscle strength to safely return to OF without symptoms or restrictions.    States he is doing better straightening out elbow.  Nighttime pain is still the worst.  He continues to sleep in a chair and awakens 5-6 times a night.  He is coming out of the sling some throughout the day to allow the arm to straighten and for ROM below 90 degrees.      Test used Initial score  2/13/24 03/12/2024   Pain Summary VAS 3/10 current  7/10 at worst 2-3/10   Nighttime pain is bad   Functional questionnaire Quick DASH

## 2024-03-14 ENCOUNTER — HOSPITAL ENCOUNTER (OUTPATIENT)
Dept: PHYSICAL THERAPY | Age: 54
Setting detail: THERAPIES SERIES
Discharge: HOME | End: 2024-03-14
Payer: COMMERCIAL

## 2024-03-14 PROCEDURE — 97140 MANUAL THERAPY 1/> REGIONS: CPT

## 2024-03-14 PROCEDURE — 97110 THERAPEUTIC EXERCISES: CPT

## 2024-03-14 PROCEDURE — 97112 NEUROMUSCULAR REEDUCATION: CPT

## 2024-03-14 NOTE — FLOWSHEET NOTE
[facility]- Outpatient Rehabilitation and Therapy [address], [place] [address] office: [phone] fax: [phone]        Physical Therapy: TREATMENT/PROGRESS NOTE   Patient: Juan Miguel Reyes (53 y.o. male)   Treatment Date: 2024   :  1970 MRN: [id number]   Visit #: 8   Insurance Allowable Auth Needed   12 visits  () [x]Yes    []No    Insurance: Payor: BC / Plan: [facility] - [place] PPO / Product Type: *No Product type* /   Insurance ID: [id number] - ([facility])  Secondary Insurance (if applicable):    Treatment Diagnosis:     ICD-10-CM    1. Acute pain of right shoulder  M25.511       2. Muscle weakness of right upper extremity  M62.81          Medical Diagnosis:    Traumatic incomplete tear of right rotator cuff, initial encounter [S46.011D] s/p R Supraspinatus Repair with Subpectoral Bicep Tenodesis 2024    Referring Physician: Gumaro Birch DO  PCP: Macario Boyer APRN - CNP                             Plan of care signed: YES    Date of Patient follow up with Physician:      Progress Report/POC: NO  POC update due: (10 visits /OR AUTH LIMITS, whichever is less)  2024     Precautions/ Contra-indications:                                                                                          Latex allergy:  NO  Pacemaker:    NO  Contraindications for Manipulation: recent surgical history (relative)  Date of Surgery: 2024  Other:  Medium Repair Protocol         Plan:      The patient overall is doing well.  I will get him set up with physical therapy at [place] for a medium rotator cuff repair protocol.  Forward flexion to 110 degrees, external rotation to 30 degrees, avoid behind the back for now.  Okay for biceps and triceps work.  Okay for shoulder shrugs as well as scapular strengthening.  Follow-up in 2 weeks for recheck     . Juan Miguel Reyes is in agreement with this plan. All questions were answered to patient's satisfaction and was

## 2024-03-15 ENCOUNTER — OFFICE VISIT (OUTPATIENT)
Dept: ORTHOPEDIC SURGERY | Age: 54
End: 2024-03-15

## 2024-03-15 VITALS — WEIGHT: 183 LBS | BODY MASS INDEX: 24.79 KG/M2 | HEIGHT: 72 IN

## 2024-03-15 DIAGNOSIS — S46.011A TRAUMATIC INCOMPLETE TEAR OF RIGHT ROTATOR CUFF, INITIAL ENCOUNTER: Primary | ICD-10-CM

## 2024-03-15 PROCEDURE — 99024 POSTOP FOLLOW-UP VISIT: CPT | Performed by: STUDENT IN AN ORGANIZED HEALTH CARE EDUCATION/TRAINING PROGRAM

## 2024-03-15 NOTE — PROGRESS NOTES
Chief Complaint  Shoulder Pain (LEFT SHOULDER PAIN. )      History of Present Illness:  The patient is here for repeat evaluation regarding his right shoulder.  The patient is 6 weeks out.  He is doing well.  He still has a little bit of tightness and difficulty with lifting his arm forward.  The patient is wondering when he can return to work.  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    Prior HPI 2/8/2024:  Juan Miguel Reyes is a pleasant 53 y.o. male here today for his first postop evaluation regarding his right shoulder.  He is10 days status post right shoulder arthroscopy with rotator cuff repair of the supraspinatus and subpectoral biceps tenodesis, date of procedure 1/29/2024. Pain controlled.         Medical History:  Patient's medications, allergies, past medical, surgical, social and family histories were reviewed and updated as appropriate.    Pertinent items are noted in HPI  Review of systems reviewed from Patient History Form available in the patient's chart under the Media tab.       Vital Signs:  There were no vitals filed for this visit.      Constitutional: In no apparent distress. Normal affect. Alert and oriented X3 and is cooperative.       Right shoulder exam    Well-healed surgical incisions to the shoulder.  No signs of infection or drainage.  Appropriately tender to palpation.  Compartments soft.  Ulnar/Median/AIN/PIN motor intact.  C4-T1 sensation grossly intact.  Radial pulse 2+ with BCR.  Active forward flexion to 45 degrees with passively up to 100, external rotation to 35 degrees, internal rotation deferred today.  4+ out of 5 infraspinatus strength today.        Radiology:       No new x-rays today.         Assessment : 53-year-old male 6 weeks status post right shoulder arthroscopy with rotator cuff repair of the supraspinatus and subpectoral biceps tenodesis, date of procedure

## 2024-03-19 ENCOUNTER — HOSPITAL ENCOUNTER (OUTPATIENT)
Dept: PHYSICAL THERAPY | Age: 54
Setting detail: THERAPIES SERIES
Discharge: HOME | End: 2024-03-19
Payer: COMMERCIAL

## 2024-03-19 PROCEDURE — 97140 MANUAL THERAPY 1/> REGIONS: CPT

## 2024-03-19 PROCEDURE — 97112 NEUROMUSCULAR REEDUCATION: CPT

## 2024-03-19 PROCEDURE — 97110 THERAPEUTIC EXERCISES: CPT

## 2024-03-19 NOTE — FLOWSHEET NOTE
EVAL:LOW (83529 - Typically 20 minutes face-to-face)     Neuromusc. Re-ed (31487) 10' 1  Re-Eval (32461)     Manual (93873) 20' 1  Estim Unattended (38832)     Ther. Act (58588)    Mech. Traction (15316)     Gait (48554)    Dry Needle 1-2 muscle (14195)     Aquatic Therex (54945)    Dry Needle 3+ muscle (20561)     Iontophoresis (27460)    VASO (50905)     Ultrasound (01223)    Group Therapy (18115)     Estim Attended (08769)    Canalith Repositioning (32657)     Other:    Other:    Total Timed Code Tx Minutes 60' 4       Total Treatment Minutes 60'        Charge Justification:  (72799) THERAPEUTIC EXERCISE - Provided verbal/tactile cueing for activities related to strengthening, flexibility, endurance, ROM performed to prevent loss of range of motion, maintain or improve muscular strength or increase flexibility, following either an injury or surgery.   (83403) NEUROMUSCULAR RE-EDUCATION - Therapeutic procedure, 1 or more areas, each 15 minutes; neuromuscular reeducation of movement, balance, coordination, kinesthetic sense, posture, and/or proprioception for sitting and/or standing activities  (67665) MANUAL THERAPY -  Manual therapy techniques, 1 or more regions, each 15 minutes (Mobilization/manipulation, manual lymphatic drainage, manual traction) for the purpose of modulating pain, promoting relaxation,  increasing ROM, reducing/eliminating soft tissue swelling/inflammation/restriction, improving soft tissue extensibility and allowing for proper ROM for normal function with self care, mobility, lifting and ambulation    TREATMENT PLAN   Plan: Cont POC- Continue emphasis/focus on exercise progression, improving proper muscle recruitment and activation/motor control patterns, reduce/eliminate soft tissue swelling/inflammation/restriction, and allowing for proper ROM. Next visit plan to progress reps, add new exercises, and continue current phase     Electronically Signed by Juan Olguin PTA

## 2024-03-22 ENCOUNTER — HOSPITAL ENCOUNTER (OUTPATIENT)
Dept: PHYSICAL THERAPY | Age: 54
Setting detail: THERAPIES SERIES
Discharge: HOME | End: 2024-03-22
Payer: COMMERCIAL

## 2024-03-22 PROCEDURE — 97110 THERAPEUTIC EXERCISES: CPT

## 2024-03-22 PROCEDURE — 97112 NEUROMUSCULAR REEDUCATION: CPT

## 2024-03-22 PROCEDURE — 97140 MANUAL THERAPY 1/> REGIONS: CPT

## 2024-03-22 NOTE — FLOWSHEET NOTE
cages to prevent reinjury.     . Juan Miguel Reyes is in agreement with this plan. All questions were answered to patient's satisfaction and was encouraged to call with any further questions.     Gumaro Birch,   Orthopedic Surgery and Sports Medicine  3/15/2024    Preferred Language for Healthcare:   [x]English       []other:    SUBJECTIVE EXAMINATION     Patient Report/Comments: (8 wks 3/25/2024) Pt states he is doing ok, still has a lot of popping with AROM.        Test used Initial score  2/13/24 03/22/2024   Pain Summary VAS 3/10 current  7/10 at worst 3-4/10   Functional questionnaire Quick DASH 48/55  84%    Other: PROM shoulder                OBJECTIVE EXAMINATION     Observation: Patient holds arm in protective positioning; TC to relax upper trap    Test measurements: See Eval    Exercises/Interventions:     Therapeutic Ex (26867)  resistance Sets/time Reps Notes/Cues/Progressions   Pulleys   5'             Wall slides  10\" x 10  Flexion          SB flexion stretch with wedge  5\" x20            Ball on trampoline   20 x One hand + Black PB  Up/Dwn, L/R, CW/CCW          Upper Trap Stretch     Levator Scap Stretch            Elbow flexion 3 way 1#  20 x           PRE Flexion / Scaption  2 x  10 x         Rows / Ext  20x  Red TB   IR/ER  NV                                Supine Punch   20 x     Supine cane ER stretch  10\" 10 x     Supine cane press  1 20 x     Supine Shoulder Flex with cane  10\" 10 x     Supine shoulder flexion AROM          SL ER / abd   x20           Table Slides Flexion  1  10\" 10 x     Table Slides Scap  1  10\" 10 x            Manual Intervention (28926)  TIME     Jt Mobs (grad 1 and 2)  5'     PROM Shoulder  10'     STM to Bicep muscle belly  5'                   NMR re-education (27093) resistance Sets/time Reps CUES NEEDED   Scap Squeeze  5\" 20 x  Tactile and Verbal Cues Required for activation of scapular stabilizers and proper technique   Shrug with Scap Squeeze  3\" 20 x  Tactile and

## 2024-03-26 ENCOUNTER — HOSPITAL ENCOUNTER (OUTPATIENT)
Dept: PHYSICAL THERAPY | Age: 54
Setting detail: THERAPIES SERIES
Discharge: HOME | End: 2024-03-26
Payer: COMMERCIAL

## 2024-03-26 PROCEDURE — 97112 NEUROMUSCULAR REEDUCATION: CPT

## 2024-03-26 PROCEDURE — 97140 MANUAL THERAPY 1/> REGIONS: CPT

## 2024-03-26 PROCEDURE — 97110 THERAPEUTIC EXERCISES: CPT

## 2024-03-26 NOTE — FLOWSHEET NOTE
[facility]- Outpatient Rehabilitation and Therapy [address], [place] [address] office: [phone] fax: [phone]        Physical Therapy: TREATMENT/PROGRESS NOTE   Patient: Juan Miguel Reyes (53 y.o. male)   Treatment Date: 2024   :  1970 MRN: [id number]   Visit #: 11   Insurance Allowable Auth Needed   12 visits  () [x]Yes    []No    Insurance: Payor: BCBS / Plan: BCBS [place] PPO / Product Type: *No Product type* /   Insurance ID: [id number] - ([facility])  Secondary Insurance (if applicable):    Treatment Diagnosis:     ICD-10-CM    1. Acute pain of right shoulder  M25.511       2. Muscle weakness of right upper extremity  M62.81          Medical Diagnosis:    Traumatic incomplete tear of right rotator cuff, initial encounter [S46.011D] s/p R Supraspinatus Repair with Subpectoral Bicep Tenodesis 2024    Referring Physician: Gumaro Birch DO  PCP: Macario Boyer APRN - CNP                             Plan of care signed: YES    Date of Patient follow up with Physician:      Progress Report/POC: NO  POC update due: (10 visits /OR AUTH LIMITS, whichever is less)  2024     Precautions/ Contra-indications:                                                                                          Latex allergy:  NO  Pacemaker:    NO  Contraindications for Manipulation: recent surgical history (relative)  Date of Surgery: 2024  Other:  Medium Repair Protocol         Plan:      Discontinue the sling today.  Move forward with unrestricted range of motion in forward flexion.  Start internal rotation in 2 weeks.  The patient will likely require another 6 weeks off of work to prevent reinjury or failure of the repair.  He has no light duty that he can return to.  We will give him a note today.  Follow-up in 6 weeks for recheck.  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

## 2024-03-28 ENCOUNTER — HOSPITAL ENCOUNTER (OUTPATIENT)
Dept: PHYSICAL THERAPY | Age: 54
Setting detail: THERAPIES SERIES
End: 2024-03-28
Payer: COMMERCIAL

## 2024-04-02 ENCOUNTER — HOSPITAL ENCOUNTER (OUTPATIENT)
Dept: PHYSICAL THERAPY | Age: 54
Setting detail: THERAPIES SERIES
Discharge: HOME | End: 2024-04-02
Payer: COMMERCIAL

## 2024-04-02 PROCEDURE — 97112 NEUROMUSCULAR REEDUCATION: CPT

## 2024-04-02 PROCEDURE — 97140 MANUAL THERAPY 1/> REGIONS: CPT

## 2024-04-02 PROCEDURE — 97110 THERAPEUTIC EXERCISES: CPT

## 2024-04-02 NOTE — FLOWSHEET NOTE
[facility]- Outpatient Rehabilitation and Therapy [address], [place] [address] office: [phone] fax: [phone]        Physical Therapy: TREATMENT/PROGRESS NOTE   Patient: Juan Miguel Reyes (53 y.o. male)   Treatment Date: 2024   :  1970 MRN: [id number]   Visit #: 12   Insurance Allowable Auth Needed   12 visits  () [x]Yes    []No    Insurance: Payor: BCBS / Plan: BC[place] / Product Type: *No Product type* /   Insurance ID: [id number] - ([facility])  Secondary Insurance (if applicable):    Treatment Diagnosis:     ICD-10-CM    1. Acute pain of right shoulder  M25.511       2. Muscle weakness of right upper extremity  M62.81          Medical Diagnosis:    Traumatic incomplete tear of right rotator cuff, initial encounter [S46.011D] s/p R Supraspinatus Repair with Subpectoral Bicep Tenodesis 2024    Referring Physician: Gumaro Birch DO  PCP: Macario Boyer APRN - CNP                             Plan of care signed: YES    Date of Patient follow up with Physician:      Progress Report/POC: NO  POC update due: (10 visits /OR AUTH LIMITS, whichever is less)  2024     Precautions/ Contra-indications:                                                                                          Latex allergy:  NO  Pacemaker:    NO  Contraindications for Manipulation: recent surgical history (relative)  Date of Surgery: 2024  Other:  Medium Repair Protocol         Plan:      Discontinue the sling today.  Move forward with unrestricted range of motion in forward flexion.  Start internal rotation in 2 weeks.  The patient will likely require another 6 weeks off of work to prevent reinjury or failure of the repair.  He has no light duty that he can return to.  We will give him a note today.  Follow-up in 6 weeks for recheck.  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

## 2024-04-03 ENCOUNTER — TELEPHONE (OUTPATIENT)
Dept: ORTHOPEDIC SURGERY | Age: 54
End: 2024-04-03

## 2024-04-03 NOTE — TELEPHONE ENCOUNTER
General Question     Subject: PRIOR AUTH INFO  Patient and /or Facility Request: Juan Miguel Reyes   Contact Number:     YUMIKO FROM [employer] CALLED WITH PRIOR AUTH FOR PHYSICAL THERAPY AT [facility] FOR FIVE VISITS, DATE RANGE 04/03/ THROUGH 06/01/2024.        PRIOR   AUTH #[id number], FOR QUESTIONS CALL [phone]

## 2024-04-04 ENCOUNTER — HOSPITAL ENCOUNTER (OUTPATIENT)
Dept: PHYSICAL THERAPY | Age: 54
Setting detail: THERAPIES SERIES
End: 2024-04-04
Payer: COMMERCIAL

## 2024-04-09 ENCOUNTER — HOSPITAL ENCOUNTER (OUTPATIENT)
Dept: PHYSICAL THERAPY | Age: 54
Setting detail: THERAPIES SERIES
Discharge: HOME | End: 2024-04-09
Payer: COMMERCIAL

## 2024-04-11 ENCOUNTER — HOSPITAL ENCOUNTER (OUTPATIENT)
Dept: PHYSICAL THERAPY | Age: 54
Setting detail: THERAPIES SERIES
Discharge: HOME | End: 2024-04-11
Payer: COMMERCIAL

## 2024-04-11 PROCEDURE — 97140 MANUAL THERAPY 1/> REGIONS: CPT

## 2024-04-11 PROCEDURE — 97110 THERAPEUTIC EXERCISES: CPT

## 2024-04-11 PROCEDURE — 97112 NEUROMUSCULAR REEDUCATION: CPT

## 2024-04-11 NOTE — PLAN OF CARE
[facility]- Outpatient Rehabilitation and Therapy [address], [place] [address] office: [phone] fax: [phone]    Physical Therapy Re-Certification Plan of Care    Dear Gumaro Birch DO  ,    We had the pleasure of treating the following patient for physical therapy services at [facility] Outpatient Physical Therapy. A summary of our findings can be found in the updated assessment below.  This includes our plan of care.  If you have any questions or concerns regarding these findings, please do not hesitate to contact me at the office phone number checked above.  Thank you for the referral.     Physician Signature:________________________________Date:__________________  By signing above (or electronic signature), therapist's plan is approved by physician      Functional Outcome: 32/55; 48%  Juan Miguel Reyes 1970 continues to present with functional deficits in ROM, joint mobility, strength symmetry, endurance of strength, and eccentric control  limiting ability with reaching overhead, carrying items, lifting items, light home activity, heavy home activity, and yardwork .  During therapy this date, patient required verbal cueing, tactile cueing, modification of technique, progression of exercises and program, and manual interventions for exercise progression, increasing ROM, improving soft tissue extensibility, allowing for proper ROM, and improving postural awareness. Patient will continue to benefit from ongoing evaluation and advanced clinical decision from a Physical Therapist to improve ROM, muscle strength, and proper body mechanics to safely return to PLOF, work/work related tasks, and recreational activity without symptoms or restrictions.    Overall Response to Treatment:   [x]Patient is responding well to treatment and improvement is noted with regards to goals   []Patient should continue to improve in reasonable time if they continue HEP   []Patient has plateaued

## 2024-04-16 ENCOUNTER — HOSPITAL ENCOUNTER (OUTPATIENT)
Dept: PHYSICAL THERAPY | Age: 54
Setting detail: THERAPIES SERIES
Discharge: HOME | End: 2024-04-16
Payer: COMMERCIAL

## 2024-04-16 PROCEDURE — 97112 NEUROMUSCULAR REEDUCATION: CPT

## 2024-04-16 PROCEDURE — 97140 MANUAL THERAPY 1/> REGIONS: CPT

## 2024-04-16 PROCEDURE — 97110 THERAPEUTIC EXERCISES: CPT

## 2024-04-16 NOTE — FLOWSHEET NOTE
[facility]- Outpatient Rehabilitation and Therapy [address], [place] [address] office: [phone] fax: [phone]      Physical Therapy: TREATMENT/PROGRESS NOTE   Patient: Juan Miguel Reyes (53 y.o. male)   Treatment Date: 2024   :  1970 MRN: [id number]   Visit #:   Insurance Allowable Auth Needed   2/5 visits  (4/3/2024 -2024)  [x]Yes    []No    Insurance: Payor: [facility] / Plan: BCBS - [place] PPO / Product Type: *No Product type* /   Insurance ID: [id number] - ([facility])  Secondary Insurance (if applicable):    Treatment Diagnosis:     ICD-10-CM    1. Acute pain of right shoulder  M25.511       2. Muscle weakness of right upper extremity  M62.81          Medical Diagnosis:    Traumatic incomplete tear of right rotator cuff, initial encounter [S46.011D] s/p R Supraspinatus Repair with Subpectoral Bicep Tenodesis 2024    Referring Physician: Gumaro Birch DO  PCP: Macario Boyer APRN - CNP                             Plan of care signed: YES    Date of Patient follow up with Physician:      Progress Report/POC: NO  POC update due: (10 visits /OR AUTH LIMITS, whichever is less)  2024    Precautions/ Contra-indications:                                                                                          Latex allergy:  NO  Pacemaker:    NO  Contraindications for Manipulation: recent surgical history (relative)  Date of Surgery: 2024  Other:  Medium Repair Protocol         Plan:      Discontinue the sling today.  Move forward with unrestricted range of motion in forward flexion.  Start internal rotation in 2 weeks.  The patient will likely require another 6 weeks off of work to prevent reinjury or failure of the repair.  He has no light duty that he can return to.  We will give him a note today.  Follow-up in 6 weeks for recheck.  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

## 2024-04-19 ENCOUNTER — HOSPITAL ENCOUNTER (OUTPATIENT)
Dept: PHYSICAL THERAPY | Age: 54
Setting detail: THERAPIES SERIES
Discharge: HOME | End: 2024-04-19
Payer: COMMERCIAL

## 2024-04-19 PROCEDURE — 97112 NEUROMUSCULAR REEDUCATION: CPT | Performed by: PHYSICAL THERAPY ASSISTANT

## 2024-04-19 PROCEDURE — 97110 THERAPEUTIC EXERCISES: CPT | Performed by: PHYSICAL THERAPY ASSISTANT

## 2024-04-19 PROCEDURE — 97140 MANUAL THERAPY 1/> REGIONS: CPT | Performed by: PHYSICAL THERAPY ASSISTANT

## 2024-04-19 NOTE — FLOWSHEET NOTE
[facility]- Outpatient Rehabilitation and Therapy [address], [place] [address] office: [phone] fax: [phone]      Physical Therapy: TREATMENT/PROGRESS NOTE   Patient: Juan Miguel Reyes (53 y.o. male)   Treatment Date: 2024   :  1970 MRN: [id number]   Visit #: 15 /17  Insurance Allowable Auth Needed   2/5 visits  (4/3/2024 -2024)  [x]Yes    []No    Insurance: Payor: [facility] / Plan: BCBS [place] / Product Type: *No Product type* /   Insurance ID: [id number] - ([facility])  Secondary Insurance (if applicable):    Treatment Diagnosis:     ICD-10-CM    1. Acute pain of right shoulder  M25.511       2. Muscle weakness of right upper extremity  M62.81          Medical Diagnosis:    Traumatic incomplete tear of right rotator cuff, initial encounter [S46.011D] s/p R Supraspinatus Repair with Subpectoral Bicep Tenodesis 2024    Referring Physician: Gumaro Birch DO  PCP: Macario Boyer APRN - CNP                             Plan of care signed: YES    Date of Patient follow up with Physician:      Progress Report/POC: NO  POC update due: (10 visits /OR AUTH LIMITS, whichever is less)  2024    Precautions/ Contra-indications:                                                                                          Latex allergy:  NO  Pacemaker:    NO  Contraindications for Manipulation: recent surgical history (relative)  Date of Surgery: 2024  Other:  Medium Repair Protocol         Plan:      Discontinue the sling today.  Move forward with unrestricted range of motion in forward flexion.  Start internal rotation in 2 weeks.  The patient will likely require another 6 weeks off of work to prevent reinjury or failure of the repair.  He has no light duty that he can return to.  We will give him a note today.  Follow-up in 6 weeks for recheck.  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

## 2024-04-22 ENCOUNTER — HOSPITAL ENCOUNTER (OUTPATIENT)
Dept: PHYSICAL THERAPY | Age: 54
Setting detail: THERAPIES SERIES
End: 2024-04-22
Payer: COMMERCIAL

## 2024-04-25 ENCOUNTER — HOSPITAL ENCOUNTER (OUTPATIENT)
Dept: PHYSICAL THERAPY | Age: 54
Setting detail: THERAPIES SERIES
Discharge: HOME | End: 2024-04-25
Payer: COMMERCIAL

## 2024-04-25 PROCEDURE — 97110 THERAPEUTIC EXERCISES: CPT

## 2024-04-25 PROCEDURE — 97140 MANUAL THERAPY 1/> REGIONS: CPT

## 2024-04-25 PROCEDURE — 97112 NEUROMUSCULAR REEDUCATION: CPT

## 2024-04-25 NOTE — PLAN OF CARE
[facility]- Outpatient Rehabilitation and Therapy [address], [place] [address] office: [phone] fax: [phone]    Physical Therapy Re-Certification Plan of Care    Dear Gumaro Birch DO  ,    We had the pleasure of treating the following patient for physical therapy services at [facility] Outpatient Physical Therapy. A summary of our findings can be found in the updated assessment below.  This includes our plan of care.  If you have any questions or concerns regarding these findings, please do not hesitate to contact me at the office phone number checked above.  Thank you for the referral.     Physician Signature:________________________________Date:__________________  By signing above (or electronic signature), therapist's plan is approved by physician      Functional Outcome: Quick Dash: 25/55 32%  Juan Miguel Reyes 1970 continues to present with functional deficits in ROM, strength symmetry, and eccentric control  limiting ability with reaching overhead, lifting items, and pushing or pulling activity .  During therapy this date, patient required verbal cueing, progression of exercises and program, and manual interventions for exercise progression, improving proper muscle recruitment and activation/motor control patterns, and increasing ROM. Patient will continue to benefit from ongoing evaluation and advanced clinical decision from a Physical Therapist to improve ROM, muscle strength, proper body mechanics, and tolerance to work activity to safely return to PLOF, ADLs/IADLs, work/work related tasks, and recreational activity without symptoms or restrictions.    Overall Response to Treatment:   []Patient is responding well to treatment and improvement is noted with regards to goals   []Patient should continue to improve in reasonable time if they continue HEP   []Patient has plateaued and is no longer responding to skilled PT intervention    []Patient is getting worse and

## 2024-04-26 ENCOUNTER — OFFICE VISIT (OUTPATIENT)
Dept: ORTHOPEDIC SURGERY | Age: 54
End: 2024-04-26

## 2024-04-26 VITALS — BODY MASS INDEX: 24.79 KG/M2 | WEIGHT: 183 LBS | HEIGHT: 72 IN

## 2024-04-26 DIAGNOSIS — S46.011A TRAUMATIC INCOMPLETE TEAR OF RIGHT ROTATOR CUFF, INITIAL ENCOUNTER: Primary | ICD-10-CM

## 2024-04-26 PROCEDURE — 99024 POSTOP FOLLOW-UP VISIT: CPT | Performed by: STUDENT IN AN ORGANIZED HEALTH CARE EDUCATION/TRAINING PROGRAM

## 2024-04-26 NOTE — PROGRESS NOTES
Chief Complaint  Shoulder Pain (Ck r shoulder. )      History of Present Illness:  The patient is here for repeat evaluation regarding his right shoulder.  The patient overall is doing better.  He still has a little bit of difficulty bringing his arm down from overhead heights.  But he is ready to go back to work.  He does not think he can lift everything that his job requires him to without restrictions.    Prior HPI 3/15/2024:  The patient is here for repeat evaluation regarding his right shoulder.  The patient is 6 weeks out.  He is doing well.  He still has a little bit of tightness and difficulty with lifting his arm forward.  The patient is wondering when he can return to work.  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    Prior HPI 2/8/2024:  Juan Miguel Reyes is a pleasant 53 y.o. male here today for his first postop evaluation regarding his right shoulder.  He is10 days status post right shoulder arthroscopy with rotator cuff repair of the supraspinatus and subpectoral biceps tenodesis, date of procedure 1/29/2024. Pain controlled.         Medical History:  Patient's medications, allergies, past medical, surgical, social and family histories were reviewed and updated as appropriate.    Pertinent items are noted in HPI  Review of systems reviewed from Patient History Form available in the patient's chart under the Media tab.       Vital Signs:  There were no vitals filed for this visit.      Constitutional: In no apparent distress. Normal affect. Alert and oriented X3 and is cooperative.       Right shoulder exam    Well-healed surgical incisions to the shoulder.  No signs of infection or drainage.  Compartments soft.  Ulnar/Median/AIN/PIN motor intact.  C4-T1 sensation grossly intact.  Radial pulse 2+ with BCR.  Full range of motion equal to his opposite side today.  5 out of 5 infraspinatus and supraspinatus strength today.

## 2024-04-29 ENCOUNTER — APPOINTMENT (OUTPATIENT)
Dept: PHYSICAL THERAPY | Age: 54
End: 2024-04-29
Payer: COMMERCIAL

## 2024-05-09 ENCOUNTER — HOSPITAL ENCOUNTER (OUTPATIENT)
Dept: PHYSICAL THERAPY | Age: 54
Setting detail: THERAPIES SERIES
Discharge: HOME | End: 2024-05-09
Payer: COMMERCIAL

## 2024-05-09 PROCEDURE — 97140 MANUAL THERAPY 1/> REGIONS: CPT

## 2024-05-09 PROCEDURE — 97112 NEUROMUSCULAR REEDUCATION: CPT

## 2024-05-09 PROCEDURE — 97110 THERAPEUTIC EXERCISES: CPT

## 2024-05-09 NOTE — FLOWSHEET NOTE
GRID   CPT Code (TIMED) minutes # CPT Code (UNTIMED) #     Therex (41086)  40' 2  EVAL:LOW (96927 - Typically 20 minutes face-to-face)     Neuromusc. Re-ed (63716) 15' 1  Re-Eval (24432)     Manual (37030) 10' 1  Estim Unattended (69236)     Ther. Act (23313)    Mech. Traction (66403)     Gait (67833)    Dry Needle 1-2 muscle (37642)     Aquatic Therex (95150)    Dry Needle 3+ muscle (20561)     Iontophoresis (78274)    VASO (02089)     Ultrasound (61183)    Group Therapy (92029)     Estim Attended (98702)    Canalith Repositioning (73356)     Other:    Other:    Total Timed Code Tx Minutes 65' 4       Total Treatment Minutes 65'        Charge Justification:  (85731) THERAPEUTIC EXERCISE - Provided verbal/tactile cueing for activities related to strengthening, flexibility, endurance, ROM performed to prevent loss of range of motion, maintain or improve muscular strength or increase flexibility, following either an injury or surgery.   (33971) NEUROMUSCULAR RE-EDUCATION - Therapeutic procedure, 1 or more areas, each 15 minutes; neuromuscular reeducation of movement, balance, coordination, kinesthetic sense, posture, and/or proprioception for sitting and/or standing activities  (42293) MANUAL THERAPY -  Manual therapy techniques, 1 or more regions, each 15 minutes (Mobilization/manipulation, manual lymphatic drainage, manual traction) for the purpose of modulating pain, promoting relaxation,  increasing ROM, reducing/eliminating soft tissue swelling/inflammation/restriction, improving soft tissue extensibility and allowing for proper ROM for normal function with self care, mobility, lifting and ambulation    TREATMENT PLAN   Plan: Cont POC- Continue emphasis/focus on exercise progression, improving proper muscle recruitment and activation/motor control patterns, modulating pain, increasing ROM, and reduce/eliminate soft tissue swelling/inflammation/restriction. Next visit plan to progress weights, progress reps, and

## 2024-05-14 ENCOUNTER — HOSPITAL ENCOUNTER (OUTPATIENT)
Dept: PHYSICAL THERAPY | Age: 54
Setting detail: THERAPIES SERIES
End: 2024-05-14
Payer: COMMERCIAL

## 2024-05-14 ENCOUNTER — TELEPHONE (OUTPATIENT)
Dept: ORTHOPEDIC SURGERY | Age: 54
End: 2024-05-14

## 2024-05-16 ENCOUNTER — HOSPITAL ENCOUNTER (OUTPATIENT)
Dept: PHYSICAL THERAPY | Age: 54
Setting detail: THERAPIES SERIES
End: 2024-05-16
Payer: COMMERCIAL

## 2024-05-16 ENCOUNTER — HOSPITAL ENCOUNTER (OUTPATIENT)
Dept: PHYSICAL THERAPY | Age: 54
Setting detail: THERAPIES SERIES
Discharge: HOME | End: 2024-05-16
Payer: COMMERCIAL

## 2024-05-16 PROCEDURE — 97140 MANUAL THERAPY 1/> REGIONS: CPT

## 2024-05-16 PROCEDURE — 97110 THERAPEUTIC EXERCISES: CPT

## 2024-05-16 PROCEDURE — 97112 NEUROMUSCULAR REEDUCATION: CPT

## 2024-05-16 NOTE — FLOWSHEET NOTE
[facility]- Outpatient Rehabilitation and Therapy [address], [place] [address] office: [phone] fax: [phone]      Physical Therapy: TREATMENT/PROGRESS NOTE   Patient: Juan Miguel Reyes (53 y.o. male)   Treatment Date: 2024   :  1970 MRN: [id number]   Visit #:   Insurance Allowable Auth Needed   24 - 24  [x]Yes    []No    Insurance: Payor: BC / Plan: [facility] - [place] PPO / Product Type: *No Product type* /   Insurance ID: [id number] - ([facility])  Secondary Insurance (if applicable):    Treatment Diagnosis:     ICD-10-CM    1. Acute pain of right shoulder  M25.511       2. Muscle weakness of right upper extremity  M62.81          Medical Diagnosis:    Traumatic incomplete tear of right rotator cuff, initial encounter [S46.011D] s/p R Supraspinatus Repair with Subpectoral Bicep Tenodesis 2024    Referring Physician: Gumaro Birch DO  PCP: Macario Boyer APRN - CNP                             Plan of care signed: YES    Date of Patient follow up with Physician:      Progress Report/POC: NO  POC update due: (10 visits /OR AUTH LIMITS, whichever is less)  24    Precautions/ Contra-indications:                                                                                          Latex allergy:  NO  Pacemaker:    NO  Contraindications for Manipulation: recent surgical history (relative)  Date of Surgery: 2024  Other:  Medium Repair Protocol         Plan:      The patient overall is doing very well.  He still has some discomfort and difficulty with lifting his arm away and overhead.  I do think it is reasonable for him to return to work but I do think he needs restrictions.  I will prevent him from lifting anything more than 20 pounds.  I would like him to not lift anything away or overhead for now.  We will extend these restrictions for 2 months.  I will see him back in 6 weeks for discussion of further release of restrictions, likely up

## 2024-05-21 ENCOUNTER — HOSPITAL ENCOUNTER (OUTPATIENT)
Dept: PHYSICAL THERAPY | Age: 54
Setting detail: THERAPIES SERIES
Discharge: HOME | End: 2024-05-21
Payer: COMMERCIAL

## 2024-05-21 PROCEDURE — 97112 NEUROMUSCULAR REEDUCATION: CPT

## 2024-05-21 PROCEDURE — 97110 THERAPEUTIC EXERCISES: CPT

## 2024-05-21 PROCEDURE — 97140 MANUAL THERAPY 1/> REGIONS: CPT

## 2024-05-21 NOTE — FLOWSHEET NOTE
shoulder to 160 ° flexion to be able to use tools over head at work and to allow for proper joint functioning as indicated by patients functional daily activities.  Status: [] Progressing: [] Met: [] Not Met: [x] Adjusted  Pt to improve strength to 4+/5 or better of rotator cuff, scapular retractors, biceps, and triceps to allow for proper functional overhead and away from body lifting of objects > 50 lbs to be able to return to work without restrictions or risk for reinjury.    Status: [] Progressing: [] Met: [] Not Met: [x] Adjusted  Patient will return to Reaching activities, Bathing/Grooming, Lifting, Writing, Computer work, Gardening, Driving, and Throwing without increased symptoms or restriction to work towards return to prior level of function.                                       Status: [x] Progressing: [] Met: [] Not Met: [] Adjusted  Patient will resume normal work/leisure activities without pain.                                                                                                                Status: [x] Progressing: [] Met: [] Not Met: [] Adjusted       Overall Progression Towards Functional goals/ Treatment Progress Update:  [x] Patient is progressing as expected towards functional goals listed.    [] Progression is slowed due to complexities/Impairments listed.  [] Progression has been slowed due to co-morbidities.  [] Plan just implemented, too soon (<30days) to assess goals progression   [] Goals require adjustment due to lack of progress  [] Patient is not progressing as expected and requires additional follow up with physician  [] Other:     CHARGE CAPTURE     PT CHARGE GRID   CPT Code (TIMED) minutes # CPT Code (UNTIMED) #     Therex (43135)  39 2  EVAL:LOW (35729 - Typically 20 minutes face-to-face)     Neuromusc. Re-ed (37441) 18 1  Re-Eval (06245)     Manual (43532) 10 1  Estim Unattended (81562)     Ther. Act (52684)    [facility] Traction (21267)     Gait (28286)    Dry Needle 1-2

## 2024-05-23 ENCOUNTER — HOSPITAL ENCOUNTER (OUTPATIENT)
Dept: PHYSICAL THERAPY | Age: 54
Setting detail: THERAPIES SERIES
Discharge: HOME | End: 2024-05-23
Payer: COMMERCIAL

## 2024-05-23 PROCEDURE — 97110 THERAPEUTIC EXERCISES: CPT

## 2024-05-23 PROCEDURE — 97140 MANUAL THERAPY 1/> REGIONS: CPT

## 2024-05-23 PROCEDURE — 97112 NEUROMUSCULAR REEDUCATION: CPT

## 2024-05-23 NOTE — FLOWSHEET NOTE
or less for the Quick DASH to assist with return top prior level of function.                 Status: [x] Progressing: [] Met: [] Not Met: [] Adjusted  Improve ROM of L shoulder to 160 ° flexion to be able to use tools over head at work and to allow for proper joint functioning as indicated by patients functional daily activities.  Status: [] Progressing: [] Met: [] Not Met: [x] Adjusted  Pt to improve strength to 4+/5 or better of rotator cuff, scapular retractors, biceps, and triceps to allow for proper functional overhead and away from body lifting of objects > 50 lbs to be able to return to work without restrictions or risk for reinjury.    Status: [] Progressing: [] Met: [] Not Met: [x] Adjusted  Patient will return to Reaching activities, Bathing/Grooming, Lifting, Writing, Computer work, Gardening, Driving, and Throwing without increased symptoms or restriction to work towards return to prior level of function.                                       Status: [x] Progressing: [] Met: [] Not Met: [] Adjusted  Patient will resume normal work/leisure activities without pain.                                                                                                                Status: [x] Progressing: [] Met: [] Not Met: [] Adjusted       Overall Progression Towards Functional goals/ Treatment Progress Update:  [x] Patient is progressing as expected towards functional goals listed.    [] Progression is slowed due to complexities/Impairments listed.  [] Progression has been slowed due to co-morbidities.  [] Plan just implemented, too soon (<30days) to assess goals progression   [] Goals require adjustment due to lack of progress  [] Patient is not progressing as expected and requires additional follow up with physician  [] Other:     CHARGE CAPTURE     PT CHARGE GRID   CPT Code (TIMED) minutes # CPT Code (UNTIMED) #     Therex (12338)  40 2  EVAL:LOW (17356 - Typically 20 minutes face-to-face)     Neuromusc.

## 2024-05-28 ENCOUNTER — HOSPITAL ENCOUNTER (OUTPATIENT)
Dept: PHYSICAL THERAPY | Age: 54
Setting detail: THERAPIES SERIES
Discharge: HOME | End: 2024-05-28
Payer: COMMERCIAL

## 2024-05-28 PROCEDURE — 97112 NEUROMUSCULAR REEDUCATION: CPT

## 2024-05-28 PROCEDURE — 97140 MANUAL THERAPY 1/> REGIONS: CPT

## 2024-05-28 PROCEDURE — 97110 THERAPEUTIC EXERCISES: CPT

## 2024-05-28 NOTE — FLOWSHEET NOTE
5'     PROM shoulder  5'     STM to bicep  5'                                        Therapeutic Activity (41855)  Sets/time                                          Modalities:  declined  Vasopneumatic Compression (Game Ready): Applied to Shoulder Right for significant edema, swelling, pain control for  n/a minutes.  Relevant ICD-10 R60.9 Edema unspecified.   Girth Left Right Post Vaso   Knee: Midpatella      Knee: 5 cm above      Knee: 15 cm above      Ankle: transmalleolar      Ankle: figure 8              Education/Home Exercise Program: Patient HEP program created electronically.  Refer to [employer] access code: Access Code: [email or web address]    Access Code: [email or web address]  URL: [email or web address]  Date: 03/19/2024  Prepared by: Juan Olguin    Exercises  - Seated Scapular Retraction  - 2 x daily - 7 x weekly - 1 sets - 10 reps - 10 hold  - Seated Shoulder Shrugs  - 1 x daily - 7 x weekly - 3 sets - 10 reps  - Standing Shoulder Row with Anchored Resistance  - 1 x daily - 7 x weekly - 2 sets - 10 reps  - Shoulder extension with resistance - Neutral  - 1 x daily - 7 x weekly - 2 sets - 10 reps  - Shoulder Flexion Wall Slide with Towel  - 1 x daily - 7 x weekly - 2 sets - 10 reps  - Supine Shoulder Flexion Extension AAROM with Dowel  - 1 x daily - 7 x weekly - 2 sets - 10 reps  - Supine Shoulder External Rotation with Dowel  - 1 x daily - 7 x weekly - 2 sets - 10 reps  - Supine Shoulder Press with Dowel  - 1 x daily - 7 x weekly - 3 sets - 10 reps  - Seated Shoulder Scaption Slide at Table Top with Forearm in Neutral  - 1 x daily - 7 x weekly - 2 sets - 10 reps  - Seated Shoulder Flexion Towel Slide at Table Top  - 1 x daily - 7 x weekly - 2 sets - 10 reps  ASSESSMENT     Today's Assessment: Patient had good tolerance to today's session, reporting muscular fatigue, increased soreness, and challenge with program completed. Able to progress  resistance, exercise difficulty, and repetitions on rotator cuff and

## 2024-05-30 ENCOUNTER — HOSPITAL ENCOUNTER (OUTPATIENT)
Dept: PHYSICAL THERAPY | Age: 54
Setting detail: THERAPIES SERIES
Discharge: HOME | End: 2024-05-30
Payer: COMMERCIAL

## 2024-05-30 PROCEDURE — 97140 MANUAL THERAPY 1/> REGIONS: CPT

## 2024-05-30 PROCEDURE — 97110 THERAPEUTIC EXERCISES: CPT

## 2024-05-30 PROCEDURE — 97112 NEUROMUSCULAR REEDUCATION: CPT

## 2024-05-30 NOTE — FLOWSHEET NOTE
20x           SL ER / abd / flexion 2# / No wt/ No wt  30 x                  Manual Intervention (28177)  TIME     Joint mobs (I-II)  5'     PROM shoulder  5'     STM to bicep  5'                                        Therapeutic Activity (76841)  Sets/time                                          Modalities:  declined  Vasopneumatic Compression (Game Ready): Applied to Shoulder Right for significant edema, swelling, pain control for  n/a minutes.  Relevant ICD-10 R60.9 Edema unspecified.   Girth Left Right Post Vaso   Knee: Midpatella      Knee: 5 cm above      Knee: 15 cm above      Ankle: transmalleolar      Ankle: figure 8              Education/Home Exercise Program: Patient HEP program created electronically.  Refer to [employer]planner access code: Access Code: [email or web address]    Access Code: [email or web address]  URL: [email or web address]  Date: 03/19/2024  Prepared by: Juan Elliott-Delmer    Exercises  - Seated Scapular Retraction  - 2 x daily - 7 x weekly - 1 sets - 10 reps - 10 hold  - Seated Shoulder Shrugs  - 1 x daily - 7 x weekly - 3 sets - 10 reps  - Standing Shoulder Row with Anchored Resistance  - 1 x daily - 7 x weekly - 2 sets - 10 reps  - Shoulder extension with resistance - Neutral  - 1 x daily - 7 x weekly - 2 sets - 10 reps  - Shoulder Flexion Wall Slide with Towel  - 1 x daily - 7 x weekly - 2 sets - 10 reps  - Supine Shoulder Flexion Extension AAROM with Dowel  - 1 x daily - 7 x weekly - 2 sets - 10 reps  - Supine Shoulder External Rotation with Dowel  - 1 x daily - 7 x weekly - 2 sets - 10 reps  - Supine Shoulder Press with Dowel  - 1 x daily - 7 x weekly - 3 sets - 10 reps  - Seated Shoulder Scaption Slide at Table Top with Forearm in Neutral  - 1 x daily - 7 x weekly - 2 sets - 10 reps  - Seated Shoulder Flexion Towel Slide at Table Top  - 1 x daily - 7 x weekly - 2 sets - 10 reps    ASSESSMENT     Today's Assessment: During therapy this date, patient required visual cueing and verbal cueing for

## 2024-06-04 ENCOUNTER — HOSPITAL ENCOUNTER (OUTPATIENT)
Dept: PHYSICAL THERAPY | Age: 54
Setting detail: THERAPIES SERIES
Discharge: HOME | End: 2024-06-04
Payer: COMMERCIAL

## 2024-06-04 PROCEDURE — 97110 THERAPEUTIC EXERCISES: CPT | Performed by: PHYSICAL THERAPY ASSISTANT

## 2024-06-04 PROCEDURE — 97112 NEUROMUSCULAR REEDUCATION: CPT | Performed by: PHYSICAL THERAPY ASSISTANT

## 2024-06-04 PROCEDURE — 97140 MANUAL THERAPY 1/> REGIONS: CPT | Performed by: PHYSICAL THERAPY ASSISTANT

## 2024-06-04 NOTE — DISCHARGE SUMMARY
Status: [] Progressing: [] Met: [x] Not Met: [] Adjusted       Overall Progression Towards Functional goals/ Treatment Progress Update:  [x] Patient is progressing as expected towards functional goals listed.    [] Progression is slowed due to complexities/Impairments listed.  [] Progression has been slowed due to co-morbidities.  [] Plan just implemented, too soon (<30days) to assess goals progression   [] Goals require adjustment due to lack of progress  [] Patient is not progressing as expected and requires additional follow up with physician  [] Other:     CHARGE CAPTURE     PT CHARGE GRID   CPT Code (TIMED) minutes # CPT Code (UNTIMED) #     Therex (79972)  28 2  EVAL:LOW (67061 - Typically 20 minutes face-to-face)     Neuromusc. Re-ed (82186) 20 1  Re-Eval (80434)     Manual (64822) 14 1  Estim Unattended (34938)     Ther. Act (59094)    Mech. Traction (99112)     Gait (54513)    Dry Needle 1-2 muscle (88644)     Aquatic Therex (88741)    Dry Needle 3+ muscle (20561)     Iontophoresis (59434)    VASO (66373)     Ultrasound (80865)    Group Therapy (27405)     Estim Attended (91605)    Canalith Repositioning (65681)     Other:    Other:    Total Timed Code Tx Minutes 62 4       Total Treatment Minutes 62        Charge Justification:  (19136) THERAPEUTIC EXERCISE - Provided verbal/tactile cueing for activities related to strengthening, flexibility, endurance, ROM performed to prevent loss of range of motion, maintain or improve muscular strength or increase flexibility, following either an injury or surgery.   (21841) NEUROMUSCULAR RE-EDUCATION - Therapeutic procedure, 1 or more areas, each 15 minutes; neuromuscular reeducation of movement, balance, coordination, kinesthetic sense, posture, and/or proprioception for sitting and/or standing activities  (35014) MANUAL THERAPY -  Manual therapy techniques, 1 or more regions, each 15

## 2024-06-17 ENCOUNTER — HOSPITAL ENCOUNTER (EMERGENCY)
Age: 54
Discharge: HOME | End: 2024-06-17
Attending: EMERGENCY MEDICINE
Payer: COMMERCIAL

## 2024-06-17 VITALS
DIASTOLIC BLOOD PRESSURE: 97 MMHG | RESPIRATION RATE: 16 BRPM | BODY MASS INDEX: 25.5 KG/M2 | OXYGEN SATURATION: 95 % | SYSTOLIC BLOOD PRESSURE: 139 MMHG | WEIGHT: 188.3 LBS | HEIGHT: 72 IN | TEMPERATURE: 98.1 F | HEART RATE: 88 BPM

## 2024-06-17 DIAGNOSIS — H66.006 RECURRENT ACUTE SUPPURATIVE OTITIS MEDIA WITHOUT SPONTANEOUS RUPTURE OF TYMPANIC MEMBRANE OF BOTH SIDES: Primary | ICD-10-CM

## 2024-06-17 LAB
FLUAV RNA UPPER RESP QL NAA+PROBE: NEGATIVE
FLUBV AG NPH QL: NEGATIVE
SARS-COV-2 RDRP RESP QL NAA+PROBE: NOT DETECTED

## 2024-06-17 PROCEDURE — 87635 SARS-COV-2 COVID-19 AMP PRB: CPT

## 2024-06-17 PROCEDURE — 6370000000 HC RX 637 (ALT 250 FOR IP): Performed by: EMERGENCY MEDICINE

## 2024-06-17 PROCEDURE — 99283 EMERGENCY DEPT VISIT LOW MDM: CPT

## 2024-06-17 PROCEDURE — 87804 INFLUENZA ASSAY W/OPTIC: CPT

## 2024-06-17 PROCEDURE — 69210 REMOVE IMPACTED EAR WAX UNI: CPT

## 2024-06-17 RX ORDER — CETIRIZINE HYDROCHLORIDE 10 MG/1
10 TABLET ORAL ONCE
Status: COMPLETED | OUTPATIENT
Start: 2024-06-17 | End: 2024-06-17

## 2024-06-17 RX ORDER — CETIRIZINE HYDROCHLORIDE 10 MG/1
10 TABLET ORAL DAILY
Qty: 30 TABLET | Refills: 0 | Status: SHIPPED | OUTPATIENT
Start: 2024-06-17

## 2024-06-17 RX ORDER — CLINDAMYCIN HYDROCHLORIDE 150 MG/1
300 CAPSULE ORAL ONCE
Status: COMPLETED | OUTPATIENT
Start: 2024-06-17 | End: 2024-06-17

## 2024-06-17 RX ORDER — CLINDAMYCIN HYDROCHLORIDE 300 MG/1
300 CAPSULE ORAL 3 TIMES DAILY
Qty: 21 CAPSULE | Refills: 0 | Status: SHIPPED | OUTPATIENT
Start: 2024-06-17 | End: 2024-06-24

## 2024-06-17 RX ADMIN — CLINDAMYCIN HYDROCHLORIDE 300 MG: 150 CAPSULE ORAL at 10:53

## 2024-06-17 RX ADMIN — CETIRIZINE HYDROCHLORIDE 10 MG: 10 TABLET ORAL at 10:00

## 2024-06-17 ASSESSMENT — PAIN DESCRIPTION - LOCATION: LOCATION: EAR

## 2024-06-17 ASSESSMENT — ENCOUNTER SYMPTOMS
BACK PAIN: 0
VOMITING: 0
CONSTIPATION: 0
DIARRHEA: 0
RHINORRHEA: 0
SHORTNESS OF BREATH: 0
EYE PAIN: 0
COUGH: 0
ABDOMINAL PAIN: 0
NAUSEA: 0
EYE REDNESS: 0

## 2024-06-17 ASSESSMENT — PAIN SCALES - GENERAL: PAINLEVEL_OUTOF10: 8

## 2024-06-17 ASSESSMENT — PAIN - FUNCTIONAL ASSESSMENT: PAIN_FUNCTIONAL_ASSESSMENT: 0-10

## 2024-06-17 NOTE — ED PROVIDER NOTES
education level: None   Tobacco Use    Smoking status: Every Day     Current packs/day: 1.00     Average packs/day: 1 pack/day for 36.5 years (36.5 ttl pk-yrs)     Types: Cigarettes     Start date: 1/1/1988    Smokeless tobacco: Former     Types: Chew   Vaping Use    Vaping Use: Never used   Substance and Sexual Activity    Alcohol use: Yes     Alcohol/week: 14.0 standard drinks of alcohol     Types: 14 Cans of beer per week     Comment: 3 beers min. daily    Drug use: No    Sexual activity: Yes     Partners: Female     Social Determinants of Health     Financial Resource Strain: Low Risk  (6/6/2023)    Overall Financial Resource Strain (CARDIA)     Difficulty of Paying Living Expenses: Not hard at all   Transportation Needs: Unknown (9/27/2023)    PRAPARE - Transportation     Lack of Transportation (Non-Medical): No   Housing Stability: Unknown (9/27/2023)    Housing Stability Vital Sign     Unstable Housing in the Last Year: No     SCREENINGS        [place] Coma Scale  Eye Opening: Spontaneous  Best Verbal Response: Oriented  Best Motor Response: Obeys commands  [employer] Coma Scale Score: 15                  CIWA Assessment  BP: (!) 139/97  Pulse: 88         PHYSICAL EXAM  1 or more Elements   INITIAL VITALS: BP: (!) 139/97, Temp: 98.1 °F (36.7 °C), Pulse: 88, Respirations: 16, SpO2: 95 % Height: 182.9 cm (6')   Wt Readings from Last 3 Encounters:   06/17/24 85.4 kg (188 lb 4.8 oz)   04/26/24 83 kg (183 lb)   03/15/24 83 kg (183 lb)     Physical Exam  Constitutional:       General: He is not in acute distress.     Appearance: Normal appearance. He is not ill-appearing.   HENT:      Head: Normocephalic and atraumatic.      Right Ear: Tympanic membrane and external ear normal. There is impacted cerumen.      Left Ear: Tympanic membrane and external ear normal. There is impacted cerumen.      Nose: Nose normal. No congestion or rhinorrhea.      Mouth/Throat:      Mouth: Mucous membranes are moist.      Pharynx: No

## 2024-06-21 ENCOUNTER — OFFICE VISIT (OUTPATIENT)
Dept: ORTHOPEDIC SURGERY | Age: 54
End: 2024-06-21
Payer: COMMERCIAL

## 2024-06-21 VITALS — BODY MASS INDEX: 25.47 KG/M2 | WEIGHT: 188 LBS | HEIGHT: 72 IN

## 2024-06-21 DIAGNOSIS — S46.011A TRAUMATIC INCOMPLETE TEAR OF RIGHT ROTATOR CUFF, INITIAL ENCOUNTER: Primary | ICD-10-CM

## 2024-06-21 PROCEDURE — 99213 OFFICE O/P EST LOW 20 MIN: CPT | Performed by: STUDENT IN AN ORGANIZED HEALTH CARE EDUCATION/TRAINING PROGRAM

## 2024-06-21 NOTE — PROGRESS NOTES
Chief Complaint  Shoulder Pain (CK R SHOULDER )      History of Present Illness:  The patient is here for repeat evaluation regarding his right shoulder.  The patient's is 5 months out.  He reports he is still having a little bit of pain, 3 out of 10.  He would like to return to work fully.    Prior HPI 4/26/2024:  The patient is here for repeat evaluation regarding his right shoulder.  The patient overall is doing better.  He still has a little bit of difficulty bringing his arm down from overhead heights.  But he is ready to go back to work.  He does not think he can lift everything that his job requires him to without restrictions.    Prior HPI 3/15/2024:  The patient is here for repeat evaluation regarding his right shoulder.  The patient is 6 weeks out.  He is doing well.  He still has a little bit of tightness and difficulty with lifting his arm forward.  The patient is wondering when he can return to work.  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    Prior HPI 2/8/2024:  Juan Miguel Reyes is a pleasant 53 y.o. male here today for his first postop evaluation regarding his right shoulder.  He is10 days status post right shoulder arthroscopy with rotator cuff repair of the supraspinatus and subpectoral biceps tenodesis, date of procedure 1/29/2024. Pain controlled.         Medical History:  Patient's medications, allergies, past medical, surgical, social and family histories were reviewed and updated as appropriate.    Pertinent items are noted in HPI  Review of systems reviewed from Patient History Form available in the patient's chart under the Media tab.       Vital Signs:  There were no vitals filed for this visit.      Constitutional: In no apparent distress. Normal affect. Alert and oriented X3 and is cooperative.       Right shoulder exam    Well-healed surgical incisions to the shoulder.  No signs of infection or drainage.

## 2024-11-18 ENCOUNTER — OFFICE VISIT (OUTPATIENT)
Dept: INTERNAL MEDICINE CLINIC | Age: 54
End: 2024-11-18

## 2024-11-18 VITALS
HEART RATE: 68 BPM | TEMPERATURE: 98 F | HEIGHT: 72 IN | SYSTOLIC BLOOD PRESSURE: 122 MMHG | OXYGEN SATURATION: 90 % | BODY MASS INDEX: 25.33 KG/M2 | WEIGHT: 187 LBS | DIASTOLIC BLOOD PRESSURE: 78 MMHG

## 2024-11-18 DIAGNOSIS — H66.006 RECURRENT ACUTE SUPPURATIVE OTITIS MEDIA WITHOUT SPONTANEOUS RUPTURE OF TYMPANIC MEMBRANE OF BOTH SIDES: Primary | ICD-10-CM

## 2024-11-18 RX ORDER — AZITHROMYCIN 250 MG/1
TABLET, FILM COATED ORAL
Qty: 6 TABLET | Refills: 0 | Status: SHIPPED | OUTPATIENT
Start: 2024-11-18 | End: 2024-11-28

## 2024-11-18 SDOH — ECONOMIC STABILITY: FOOD INSECURITY: WITHIN THE PAST 12 MONTHS, THE FOOD YOU BOUGHT JUST DIDN'T LAST AND YOU DIDN'T HAVE MONEY TO GET MORE.: NEVER TRUE

## 2024-11-18 SDOH — ECONOMIC STABILITY: FOOD INSECURITY: WITHIN THE PAST 12 MONTHS, YOU WORRIED THAT YOUR FOOD WOULD RUN OUT BEFORE YOU GOT MONEY TO BUY MORE.: NEVER TRUE

## 2024-11-18 SDOH — ECONOMIC STABILITY: INCOME INSECURITY: HOW HARD IS IT FOR YOU TO PAY FOR THE VERY BASICS LIKE FOOD, HOUSING, MEDICAL CARE, AND HEATING?: NOT HARD AT ALL

## 2024-11-18 ASSESSMENT — ENCOUNTER SYMPTOMS
COUGH: 0
SINUS PRESSURE: 0
NAUSEA: 0
DIARRHEA: 0
FACIAL SWELLING: 0
SORE THROAT: 0
VOMITING: 0

## 2024-11-18 NOTE — PROGRESS NOTES
Juan Miguel Reyes  1970        Chief Complaint   Patient presents with    Ear Fullness     Right worse than left. Not a new problem. Used to see ENT.        Assessment/Plan:     1. Recurrent acute suppurative otitis media without spontaneous rupture of tympanic membrane of both sides  Start Zpak.   Flonase and Claritin/Zyrtec daily  F/u ENT if no better in 7 days. Will refer to Rebekah Watts.   Long history PE tubes      Return if symptoms worsen or fail to improve.      HPI:      Patient has pain intermittent for 1 month. Worsened over night. R worse than L    Saw ENT last 1990s. Hx tubes 15+ times in each ear.     ER 6/2024. Removed cerumen.     /78   Pulse 68   Temp 98 °F (36.7 °C)   Ht 1.829 m (6')   Wt 84.8 kg (187 lb)   SpO2 90%   BMI 25.36 kg/m²     Prior to Visit Medications    Medication Sig Taking? Authorizing Provider   azithromycin (ZITHROMAX) 250 MG tablet 500mg on day 1 followed by 250mg on days 2 - 5 Yes Joselito Teresa APRN - CNP     Family History   Problem Relation Age of Onset    Heart Attack Father         5 MI; ~40 at first MI    High Cholesterol Father     Asthma Father     Diabetes Father     Hypertension Father     No Known Problems Brother     Mult Sclerosis Maternal Grandfather      Social History     Socioeconomic History    Marital status: Legally      Spouse name: Not on file    Number of children: Not on file    Years of education: Not on file    Highest education level: Not on file   Occupational History    Not on file   Tobacco Use    Smoking status: Every Day     Current packs/day: 1.00     Average packs/day: 1 pack/day for 36.9 years (36.9 ttl pk-yrs)     Types: Cigarettes     Start date: 1/1/1988    Smokeless tobacco: Former     Types: Chew   Vaping Use    Vaping status: Never Used   Substance and Sexual Activity    Alcohol use: Yes     Alcohol/week: 14.0 standard drinks of alcohol     Types: 14 Cans of beer per week     Comment: 3 beers min. daily    Drug

## 2024-11-18 NOTE — PATIENT INSTRUCTIONS
Antibiotic sent to pharmacy    ADD Claritin or Zyrtec once daily OTC    If you think you can stand it, add Flonase nasal spray 2 sprays each nostril once a day    Ibuprofen 400-600mg up to 3 times a day    If no better in 7 days, we will refer to ENT at [facility]

## 2024-12-06 ENCOUNTER — OFFICE VISIT (OUTPATIENT)
Dept: INTERNAL MEDICINE CLINIC | Age: 54
End: 2024-12-06

## 2024-12-06 VITALS
WEIGHT: 189 LBS | SYSTOLIC BLOOD PRESSURE: 110 MMHG | HEART RATE: 72 BPM | OXYGEN SATURATION: 96 % | HEIGHT: 72 IN | TEMPERATURE: 98.1 F | DIASTOLIC BLOOD PRESSURE: 70 MMHG | BODY MASS INDEX: 25.6 KG/M2

## 2024-12-06 DIAGNOSIS — H66.90 ACUTE OTITIS MEDIA, UNSPECIFIED OTITIS MEDIA TYPE: Primary | ICD-10-CM

## 2024-12-06 RX ORDER — LEVOFLOXACIN 500 MG/1
TABLET, FILM COATED ORAL
Qty: 10 TABLET | Refills: 0 | Status: SHIPPED | OUTPATIENT
Start: 2024-12-06

## 2024-12-06 RX ORDER — PREDNISONE 20 MG/1
40 TABLET ORAL DAILY
Qty: 10 TABLET | Refills: 0 | Status: SHIPPED | OUTPATIENT
Start: 2024-12-06 | End: 2024-12-11

## 2024-12-06 ASSESSMENT — ENCOUNTER SYMPTOMS
SHORTNESS OF BREATH: 0
RHINORRHEA: 1
NAUSEA: 0
COUGH: 1
DIARRHEA: 0
CHEST TIGHTNESS: 0
CONSTIPATION: 0
SINUS PRESSURE: 1
SORE THROAT: 0
VOMITING: 0
SINUS PAIN: 0

## 2024-12-06 NOTE — PROGRESS NOTES
is present. There is no impacted cerumen. No PE tube. Tympanic membrane is scarred.      Left Ear: Hearing and external ear normal. Tenderness present. A middle ear effusion is present. There is no impacted cerumen. No PE tube. Tympanic membrane is scarred.      Nose: Nose normal.   Eyes:      General: Lids are normal. Lids are everted, no foreign bodies appreciated.      Conjunctiva/sclera: Conjunctivae normal.      Pupils: Pupils are equal, round, and reactive to light.   Neck:      Thyroid: No thyroid mass.      Vascular: Normal carotid pulses. No carotid bruit or JVD.   Cardiovascular:      Rate and Rhythm: Normal rate and regular rhythm. No extrasystoles are present.     Chest Wall: PMI is not displaced.      Pulses:           Radial pulses are 2+ on the right side and 2+ on the left side.        Dorsalis pedis pulses are 2+ on the right side and 2+ on the left side.      Heart sounds: Normal heart sounds, S1 normal and S2 normal. Heart sounds not distant. No murmur heard.     No friction rub. No gallop. No S3 or S4 sounds.   Pulmonary:      Effort: Pulmonary effort is normal. No respiratory distress.      Breath sounds: Normal breath sounds. No decreased breath sounds, wheezing, rhonchi or rales.   Abdominal:      General: Bowel sounds are normal. There is no distension.      Palpations: Abdomen is soft.      Tenderness: There is no abdominal tenderness. There is no rebound.   Musculoskeletal:         General: Normal range of motion.      Cervical back: Full passive range of motion without pain, normal range of motion and neck supple.   Skin:     General: Skin is warm and dry.   Neurological:      Cranial Nerves: No cranial nerve deficit.   Psychiatric:         Speech: Speech normal.         Behavior: Behavior normal.         Thought Content: Thought content normal.         Judgment: Judgment normal.         Assessment & Plan:     The following diagnoses and conditions are stable with no further orders unless

## 2024-12-23 NOTE — PROGRESS NOTES
he has on his eardrums.  96% word recognition score 85 dB on the right and 92% 85 dB on the left.  Recommend hearing aids which she will look into  - Natalie Kc AU.D., Audiology, [facility]    2. Tinnitus of both ears  Tinnitus is likely secondary to the hearing loss.  This would be improved with hearing aids    Follow-up in 1 year for repeat audiogram or sooner if needed  Jd Us DO  12/31/2024      Medical Decision Making:  The following items were considered in medical decision making:  Independent review of images  Review / order clinical lab tests  Review / order radiology tests  Decision to obtain old records

## 2024-12-31 ENCOUNTER — PROCEDURE VISIT (OUTPATIENT)
Dept: AUDIOLOGY | Age: 54
End: 2024-12-31
Payer: COMMERCIAL

## 2024-12-31 ENCOUNTER — OFFICE VISIT (OUTPATIENT)
Dept: ENT CLINIC | Age: 54
End: 2024-12-31
Payer: COMMERCIAL

## 2024-12-31 VITALS
HEART RATE: 66 BPM | BODY MASS INDEX: 25.6 KG/M2 | SYSTOLIC BLOOD PRESSURE: 116 MMHG | WEIGHT: 189 LBS | DIASTOLIC BLOOD PRESSURE: 79 MMHG | HEIGHT: 72 IN

## 2024-12-31 DIAGNOSIS — H90.A31 MIXED CONDUCTIVE AND SENSORINEURAL HEARING LOSS OF RIGHT EAR WITH RESTRICTED HEARING OF LEFT EAR: ICD-10-CM

## 2024-12-31 DIAGNOSIS — H90.A31 MIXED CONDUCTIVE AND SENSORINEURAL HEARING LOSS OF RIGHT EAR WITH RESTRICTED HEARING OF LEFT EAR: Primary | ICD-10-CM

## 2024-12-31 DIAGNOSIS — H93.13 TINNITUS OF BOTH EARS: ICD-10-CM

## 2024-12-31 DIAGNOSIS — H93.13 TINNITUS, BILATERAL: ICD-10-CM

## 2024-12-31 DIAGNOSIS — H90.A22 SENSORINEURAL HEARING LOSS (SNHL) OF LEFT EAR WITH RESTRICTED HEARING OF RIGHT EAR: Primary | ICD-10-CM

## 2024-12-31 PROCEDURE — 99204 OFFICE O/P NEW MOD 45 MIN: CPT | Performed by: STUDENT IN AN ORGANIZED HEALTH CARE EDUCATION/TRAINING PROGRAM

## 2024-12-31 PROCEDURE — 92567 TYMPANOMETRY: CPT | Performed by: AUDIOLOGIST

## 2024-12-31 PROCEDURE — 92557 COMPREHENSIVE HEARING TEST: CPT | Performed by: AUDIOLOGIST

## 2024-12-31 ASSESSMENT — ENCOUNTER SYMPTOMS
RHINORRHEA: 0
EYE PAIN: 0
VOMITING: 0
NAUSEA: 0
COUGH: 0
SHORTNESS OF BREATH: 0

## 2024-12-31 NOTE — PROGRESS NOTES
with middle ear fluid.      LEFT EAR:  Hearing Sensitivity: Mild to moderate to severe sensorineural hearing loss  Speech Recognition Threshold: 40 dB HL  Word Recognition: Excellent 92%, based on NU-6 25-word list at 85 dBHL masked using recorded speech stimuli.    Tympanometry: Normal peak pressure and compliance, Type A tympanogram, consistent with normal middle ear function.      Reliability: Good   Transducer: Inserts    See scanned audiogram dated 12/31/2024  for results.        PATIENT EDUCATION:       The following items were discussed with the patient:   - Good Communication Strategies  - Hearing Loss and Hearing Aids  - Tinnitus Management Strategies    - Noise-Induced Hearing Loss and use of Hearing Protection Devices (HPDs)     Educational information was shared in the After Visit Summary.                                                RECOMMENDATIONS:                                                                                                                                                                                                                                                            The following items are recommended based on patient report and results from today's appointment:   - Continue medical follow-up with Jd Us DO.    - Retest hearing as medically indicated and/or sooner if a change in hearing is noted.  - If desired, schedule a Hearing Aid Evaluation (HAE) appointment to discuss hearing aid options.  - Utilize \"Good Communication Strategies\" as discussed to assist in speech understanding with communication partners.  - Use hearing protection devices (HPDs), such as protective ear muffs and ear plugs, when exposed to dangerous sound levels.       Hadley Strauss  Audiologist    Chart CC'd to: Jd Us DO      Degree of   Hearing Sensitivity dB Range   Within Normal Limits (WNL) 0 - 20   Mild 20 - 40   Moderate 40 - 55   Moderately-Severe 55 - 70   Severe 70 - 90

## 2025-01-21 ENCOUNTER — PROCEDURE VISIT (OUTPATIENT)
Dept: AUDIOLOGY | Age: 55
End: 2025-01-21

## 2025-01-21 DIAGNOSIS — H90.A31 MIXED CONDUCTIVE AND SENSORINEURAL HEARING LOSS OF RIGHT EAR WITH RESTRICTED HEARING OF LEFT EAR: ICD-10-CM

## 2025-01-21 DIAGNOSIS — H90.A22 SENSORINEURAL HEARING LOSS (SNHL) OF LEFT EAR WITH RESTRICTED HEARING OF RIGHT EAR: Primary | ICD-10-CM

## 2025-01-21 NOTE — PROGRESS NOTES
Juan Miguel Reyes  1970.54 y.o. male   [id number]      HEARING AID EVALUATION    Juan Miguel Reyes was seen today, 1/21/2025 , for a Hearing Aid Evaluation following audiologic evaluation on 12/31/24. Patient was found to have no insurance benefit for hearing aids. Patient is responsible for cost of devices.  Hearing aid benefit worksheet scanned into media tab.      DATE: 1/21/2025     PROCEDURES:     LIFESTYLE EVALUATION:      How do you spend most of your day?  Patient is a  and notes difficulty hearing groups of people and in background noise during work. He also notes concerns for hearing surroundings while driving. Notably, patient is motivated to pursue hearing aids for benefits of tinnitus management.     Which ear do you use on the phone? Right         Land line or cell phone  Speaker- iPhone (nephtrish streams calls through his hearing aids)     Does a hearing problem cause you to feel embarrassed when you meet new people?  Yes     How do you compensate for things you miss or misunderstand?  \"Huh\" \"What did you say?\"    Does a hearing problem cause you to feel frustrated when talking to members of your family?  Yes      Do they get frustrated with you?   Yes    Does your hearing problem cause you difficulty when visiting friends, relatives, or neighbors? Yes    Does your hearing problem cause you difficulty when listening to a TV or radio?  Yes    Do others feel that your TV/radio is too loud?  Yes    Does a hearing problem cause you difficulty when in a restaurant with relatives or friends?  Yes    In what situation would you most like to hear better?   Work  In the car  Family     Do you want to be able to connect directly to your phone with your hearing aids? Yes      After a discussion of evaluation results, discussion of levels of technology and prices, and lifestyle assessment, Juan Miguel Reyes has decided to pursue hearing aids.     Technology to be considered: [employer] Real1 Jayson Reed

## 2025-02-10 ENCOUNTER — PROCEDURE VISIT (OUTPATIENT)
Dept: AUDIOLOGY | Age: 55
End: 2025-02-10

## 2025-02-10 DIAGNOSIS — H90.A31 MIXED CONDUCTIVE AND SENSORINEURAL HEARING LOSS OF RIGHT EAR WITH RESTRICTED HEARING OF LEFT EAR: ICD-10-CM

## 2025-02-10 DIAGNOSIS — H90.A22 SENSORINEURAL HEARING LOSS (SNHL) OF LEFT EAR WITH RESTRICTED HEARING OF RIGHT EAR: Primary | ICD-10-CM

## 2025-02-10 DIAGNOSIS — H93.13 TINNITUS, BILATERAL: ICD-10-CM

## 2025-02-10 PROCEDURE — V5261 HEARING AID, DIGIT, BIN, BTE: HCPCS | Performed by: AUDIOLOGIST

## 2025-02-10 PROCEDURE — V5020 CONFORMITY EVALUATION: HCPCS | Performed by: AUDIOLOGIST

## 2025-02-10 PROCEDURE — V5160 DISPENSING FEE BINAURAL: HCPCS | Performed by: AUDIOLOGIST

## 2025-02-10 PROCEDURE — V5265 EAR MOLD/INSERT, DISP: HCPCS | Performed by: AUDIOLOGIST

## 2025-02-10 NOTE — PROGRESS NOTES
Juan Miguel Reyes   1970, 54 y.o. male   [id number]     HEARING AID FITTING      RIGHT EAR: /MODEL: Oticon Real1 miniRITE-R  SN: [id number]  EARMOLD/TUBING/WIRE/DOME: 3(85)  with 8mm power domes and retention lock     LEFT EAR: /MODEL: Oticon Real1 miniRITE-R   SN: [id number]  EARMOLD/TUBING/WIRE/DOME: 2(85)  with 8mm power domes and retention lock    HAF: 2/10/25  WARRANTY: 28  TRIAL PERIOD ENDS:3/12/25  L&D ELIGIBLE: Yes    BUTTONS: ENABLED  PROGRAMS: ENABLED  PHONE CONNECTIVITY: Connected to Phone only (possibly farrah at next appointment    Juan Miguel Reyes was seen today,2/10/2025,  to be fit with Oticon Real1 miniRITE-R hearing aids. Tried both 8mm double bass and 8mm power-feedback measurements better with power domes. Will order 2(85) right  for follow-up.  Hearing aids were programmed to acclimatization level 3.  Live speech mapping was completed with a good match to NAL-NL2 targets from 250-8000 Hz, AU.  After adjustments patient reported overall own voice sounded like recording and could hear; however explained realistic expectations and importance of consistent use.         **RECHARGEABLE Device orientation was completed, includin. Hearing aid insertion/removal   2. Buttons/Indicators   3. Rechargeable battery use and life expectancy     4.  insertion/removal     5. Cleaning/maintenance of the device     6. Loss & Damage/Repair warranty information   7. 30-day right-to-return period    Juan Miguel Reyes noted comfort and good sound quality.  It was recommended that patient return for a follow-up appointment within the 30-day right-to-return period for further programming adjustments and education of the devices as warranted.      PATIENT EDUCATION:       Juan Miguel Reyes was provided with the Hearing Aid Fitting Checklist as a reference of items discussed at today's appointment.  Patient may find additional product information in the provided hearing aid

## 2025-02-18 ENCOUNTER — OFFICE VISIT (OUTPATIENT)
Dept: INTERNAL MEDICINE CLINIC | Age: 55
End: 2025-02-18

## 2025-02-18 ENCOUNTER — TELEPHONE (OUTPATIENT)
Dept: INTERNAL MEDICINE CLINIC | Age: 55
End: 2025-02-18

## 2025-02-18 VITALS
SYSTOLIC BLOOD PRESSURE: 127 MMHG | BODY MASS INDEX: 25.77 KG/M2 | OXYGEN SATURATION: 96 % | HEART RATE: 76 BPM | DIASTOLIC BLOOD PRESSURE: 88 MMHG | WEIGHT: 190 LBS

## 2025-02-18 DIAGNOSIS — M54.42 ACUTE LEFT-SIDED LOW BACK PAIN WITH LEFT-SIDED SCIATICA: Primary | ICD-10-CM

## 2025-02-18 RX ORDER — CYCLOBENZAPRINE HCL 5 MG
5 TABLET ORAL 2 TIMES DAILY PRN
Qty: 20 TABLET | Refills: 0 | Status: SHIPPED | OUTPATIENT
Start: 2025-02-18 | End: 2025-02-28

## 2025-02-18 RX ORDER — METHYLPREDNISOLONE 4 MG/1
TABLET ORAL
Qty: 1 KIT | Refills: 0 | Status: SHIPPED | OUTPATIENT
Start: 2025-02-18 | End: 2025-02-24

## 2025-02-18 ASSESSMENT — ENCOUNTER SYMPTOMS
BACK PAIN: 1
CHEST TIGHTNESS: 0
VOMITING: 0
SHORTNESS OF BREATH: 0
CONSTIPATION: 0
NAUSEA: 0
SINUS PAIN: 0
COUGH: 0
DIARRHEA: 0
SORE THROAT: 0

## 2025-02-18 NOTE — PROGRESS NOTES
Juan Miguel Reyes (:  1970) is a 54 y.o. male, here for evaluation of the following chief complaint(s):  Other (Back pain after bath tube fall this morning)         Assessment & Plan  1. Low back pain.  The patient experienced a twisting injury to his lower back this morning, resulting in pain and tingling. This is similar to a previous episode in  but on the opposite side. Physical examination revealed tightness in the muscles along the spine. The primary objective is to alleviate inflammation and muscle tension. Given the early stage of the injury, no updated x-rays are deemed necessary at this point. He is advised to apply a heating pad or ice to the affected area for comfort. A prescription for a 6-day course of steroids has been provided, with the expectation that significant relief should be experienced within the first two days. Additionally, a muscle relaxer has been prescribed to facilitate relaxation by the end of the day. If symptoms persist, worsen, or if there is increased numbness down the leg, further diagnostic measures will be considered.    Results    1. Acute left-sided low back pain with left-sided sciatica  -     methylPREDNISolone (MEDROL DOSEPACK) 4 MG tablet; Take by mouth., Disp-1 kit, R-0Normal  -     cyclobenzaprine (FLEXERIL) 5 MG tablet; Take 1 tablet by mouth 2 times daily as needed for Muscle spasms, Disp-20 tablet, R-0Normal    No follow-ups on file.       Subjective   History of Present Illness  The patient presents for evaluation of low back pain.    He experienced an incident this morning where he twisted his lower back upon exiting the shower, resulting in a sensation of tingling. This event is reminiscent of a similar occurrence in , although he believes it to be on the opposite side. 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 He has not yet sought relief through over-the-counter medications

## 2025-02-18 NOTE — TELEPHONE ENCOUNTER
Patient states he was in this morning for back pain. He is requesting a work note for him to be off starting yesterday through the remainder of the week. Would like to return on 02/24.    Please advise

## 2025-03-27 ENCOUNTER — PROCEDURE VISIT (OUTPATIENT)
Dept: AUDIOLOGY | Age: 55
End: 2025-03-27

## 2025-03-27 DIAGNOSIS — H90.A31 MIXED CONDUCTIVE AND SENSORINEURAL HEARING LOSS OF RIGHT EAR WITH RESTRICTED HEARING OF LEFT EAR: ICD-10-CM

## 2025-03-27 DIAGNOSIS — H90.A22 SENSORINEURAL HEARING LOSS (SNHL) OF LEFT EAR WITH RESTRICTED HEARING OF RIGHT EAR: Primary | ICD-10-CM

## 2025-03-27 NOTE — PROGRESS NOTES
Juan Miguel Reyes   1970, 54 y.o. male   [id number]     HEARING AID FITTING FOLLOW-UP    Juan Miguel Reyes was seen today, 3/27/2025,  for first follow-up after being fit with Oticon Real 1 miniRITE-R hearing aids.  He reported significant improvement with the hearing aids.  He noted left hearing aid was intermittent.  Patient stated he has some difficulty distinguishing who he needs to hear versus other people in the background.      RIGHT EAR: /MODEL: Oticon Real1 miniRITE-R  SN: [id number]  EARMOLD/TUBING/WIRE/DOME: 3(85)  with 8mm power domes and retention lock     LEFT EAR: /MODEL: Oticon Real1 miniRITE-R   SN: [id number]  EARMOLD/TUBING/WIRE/DOME: 2(85)  with 8mm power domes and retention lock    HAF: 2/10/25  WARRANTY: 02/23/28  TRIAL PERIOD ENDS:3/12/25  L&D ELIGIBLE: Yes     BUTTONS: ENABLED  PROGRAMS: ENABLED  PHONE CONNECTIVITY: Connected to Phone only (possibly farrah at next appointment    PROCEDURES:     Switched out left .  Will consider switching to a length 3  if problem persist due to patient insertion.  Connected hearing aids to software and maxed out noise suppression in easy and complex settings.  Changed from balanced to focus.  Patient reported improvement in ambient noise in the office.     Reviewed cleaning and maintenance of hearing aids. Patient demonstrated she was able to change wax guards and domes in office.     Patient Education:       - Verbally and visually reviewed care and maintenance of devices.   - Reviewed Hearing Aid Fitting checklist and Troubleshooting document given to patient.     Information was shared verbally with patient during appointment.      RECOMMENDATIONS:     1. Continue wearing both HAs during all waking hours.  2. Return for follow-up as scheduled.  3. Retest hearing as medically indicated and/or sooner if a change in hearing is noted.    Patient paid remaining hearing aid balance of $1100.00     Evon Bernard

## 2025-04-16 ENCOUNTER — HOSPITAL ENCOUNTER (EMERGENCY)
Age: 55
Discharge: HOME | End: 2025-04-16
Attending: EMERGENCY MEDICINE
Payer: COMMERCIAL

## 2025-04-16 VITALS
WEIGHT: 181 LBS | BODY MASS INDEX: 24.52 KG/M2 | DIASTOLIC BLOOD PRESSURE: 83 MMHG | SYSTOLIC BLOOD PRESSURE: 133 MMHG | TEMPERATURE: 98.4 F | HEART RATE: 77 BPM | RESPIRATION RATE: 16 BRPM | HEIGHT: 72 IN | OXYGEN SATURATION: 99 %

## 2025-04-16 DIAGNOSIS — R11.2 NAUSEA AND VOMITING, UNSPECIFIED VOMITING TYPE: Primary | ICD-10-CM

## 2025-04-16 LAB
ALBUMIN SERPL-MCNC: 3.8 G/DL (ref 3.4–5)
ALBUMIN/GLOB SERPL: 1.5 {RATIO} (ref 1.1–2.2)
ALP SERPL-CCNC: 79 U/L (ref 40–129)
ALT SERPL-CCNC: 17 U/L (ref 10–40)
ANION GAP SERPL CALCULATED.3IONS-SCNC: 9 MMOL/L (ref 3–16)
AST SERPL-CCNC: 18 U/L (ref 15–37)
BASOPHILS # BLD: 0.1 K/UL (ref 0–0.2)
BASOPHILS NFR BLD: 0.9 %
BILIRUB SERPL-MCNC: <0.2 MG/DL (ref 0–1)
BUN SERPL-MCNC: 7 MG/DL (ref 7–20)
CALCIUM SERPL-MCNC: 8.9 MG/DL (ref 8.3–10.6)
CHLORIDE SERPL-SCNC: 108 MMOL/L (ref 99–110)
CO2 SERPL-SCNC: 26 MMOL/L (ref 21–32)
CREAT SERPL-MCNC: 0.8 MG/DL (ref 0.9–1.3)
DEPRECATED RDW RBC AUTO: 13.6 % (ref 12.4–15.4)
EOSINOPHIL # BLD: 0.1 K/UL (ref 0–0.6)
EOSINOPHIL NFR BLD: 2 %
FLUAV RNA RESP QL NAA+PROBE: NOT DETECTED
FLUBV RNA RESP QL NAA+PROBE: NOT DETECTED
GFR SERPLBLD CREATININE-BSD FMLA CKD-EPI: >90 ML/MIN/{1.73_M2}
GLUCOSE SERPL-MCNC: 88 MG/DL (ref 70–99)
HCT VFR BLD AUTO: 45.4 % (ref 40.5–52.5)
HGB BLD-MCNC: 15.4 G/DL (ref 13.5–17.5)
LIPASE SERPL-CCNC: 29 U/L (ref 13–60)
LYMPHOCYTES # BLD: 1.4 K/UL (ref 1–5.1)
LYMPHOCYTES NFR BLD: 22.6 %
MCH RBC QN AUTO: 33.4 PG (ref 26–34)
MCHC RBC AUTO-ENTMCNC: 33.9 G/DL (ref 31–36)
MCV RBC AUTO: 98.4 FL (ref 80–100)
MONOCYTES # BLD: 0.3 K/UL (ref 0–1.3)
MONOCYTES NFR BLD: 5.4 %
NEUTROPHILS # BLD: 4.3 K/UL (ref 1.7–7.7)
NEUTROPHILS NFR BLD: 69.1 %
PLATELET # BLD AUTO: 220 K/UL (ref 135–450)
PMV BLD AUTO: 9.1 FL (ref 5–10.5)
POTASSIUM SERPL-SCNC: 4.2 MMOL/L (ref 3.5–5.1)
PROT SERPL-MCNC: 6.4 G/DL (ref 6.4–8.2)
RBC # BLD AUTO: 4.61 M/UL (ref 4.2–5.9)
SARS-COV-2 RNA RESP QL NAA+PROBE: NOT DETECTED
SODIUM SERPL-SCNC: 143 MMOL/L (ref 136–145)
WBC # BLD AUTO: 6.3 K/UL (ref 4–11)

## 2025-04-16 PROCEDURE — 87636 SARSCOV2 & INF A&B AMP PRB: CPT

## 2025-04-16 PROCEDURE — 83690 ASSAY OF LIPASE: CPT

## 2025-04-16 PROCEDURE — 85025 COMPLETE CBC W/AUTO DIFF WBC: CPT

## 2025-04-16 PROCEDURE — 2580000003 HC RX 258: Performed by: EMERGENCY MEDICINE

## 2025-04-16 PROCEDURE — 99284 EMERGENCY DEPT VISIT MOD MDM: CPT

## 2025-04-16 PROCEDURE — 6360000002 HC RX W HCPCS: Performed by: EMERGENCY MEDICINE

## 2025-04-16 PROCEDURE — 80053 COMPREHEN METABOLIC PANEL: CPT

## 2025-04-16 PROCEDURE — 96374 THER/PROPH/DIAG INJ IV PUSH: CPT

## 2025-04-16 PROCEDURE — 36415 COLL VENOUS BLD VENIPUNCTURE: CPT

## 2025-04-16 RX ORDER — 0.9 % SODIUM CHLORIDE 0.9 %
1000 INTRAVENOUS SOLUTION INTRAVENOUS ONCE
Status: COMPLETED | OUTPATIENT
Start: 2025-04-16 | End: 2025-04-16

## 2025-04-16 RX ORDER — ONDANSETRON 4 MG/1
4 TABLET, ORALLY DISINTEGRATING ORAL 3 TIMES DAILY PRN
Qty: 21 TABLET | Refills: 0 | Status: SHIPPED | OUTPATIENT
Start: 2025-04-16

## 2025-04-16 RX ORDER — ONDANSETRON 2 MG/ML
4 INJECTION INTRAMUSCULAR; INTRAVENOUS ONCE
Status: COMPLETED | OUTPATIENT
Start: 2025-04-16 | End: 2025-04-16

## 2025-04-16 RX ADMIN — SODIUM CHLORIDE 1000 ML: 0.9 INJECTION, SOLUTION INTRAVENOUS at 09:24

## 2025-04-16 RX ADMIN — ONDANSETRON 4 MG: 2 INJECTION, SOLUTION INTRAMUSCULAR; INTRAVENOUS at 09:24

## 2025-04-16 NOTE — ED PROVIDER NOTES
[facility] Emergency Department      CHIEF COMPLAINT  Vomiting (Emesis started Monday.  Had some diarrhea with that.  Today has had some vomiting and a headache.)      HISTORY OF PRESENT ILLNESS  Juan Miguel Reyes is a 54 y.o. male otherwise healthy presents with nausea, vomiting and headache.  States he woke up Monday morning with diarrhea.  This was short-lived and resolved.  He then woke up yesterday morning and was vomiting.  It was just in the morning.  He was able to go to work.  Only really ate chicken noodle soup yesterday.  Was trying to drink water.  This morning woke up and again had vomiting.  States his abdomen feels tight just sore from vomiting.  Went to work and vomited there.  Is also having a mild headache.  No fevers.  No blood in his vomit.  His girlfriend at home has had similar symptoms.  He denies any history of abdominal surgeries.  No other complaints, modifying factors or associated symptoms.     History obtained from the patient.    I have reviewed the following from the nursing documentation.    Past Medical History:   Diagnosis Date    MRSA (methicillin resistant staph aureus) culture positive 09/09/2011    Finger positive for MRSA    Recurrent otitis media of both ears     Smoking history      Past Surgical History:   Procedure Laterality Date    FRACTURE SURGERY Left     ORTHOPEDIC SURGERY      L elbow    SHOULDER ARTHROSCOPY Right 1/29/2024    RIGHT SHOULDER VIDEO ARTHROSCOPY, ROTATOR CUFF REPAIR SUBPECTORAL BICEPS TENODESIS performed by Gumaro Birch DO at [facility] OR    TYMPANOSTOMY TUBE PLACEMENT      15 tubes in right, 16 tubes in left over the years     Family History   Problem Relation Age of Onset    Heart Attack Father         5 MI; ~40 at first MI    High Cholesterol Father     Asthma Father     Diabetes Father     Hypertension Father     No Known Problems Brother     Mult Sclerosis Maternal Grandfather      Social History     Socioeconomic History    Marital

## 2025-04-16 NOTE — DISCHARGE INSTRUCTIONS
Your lab work is reassuring.  Your COVID and flu tests are negative.  I suspect you have a viral stomach illness.  You were feeling better after IV fluids.  Continue to hydrate at home.  You can alternate Tylenol and ibuprofen for any headaches.  Rest.  Follow-up with your primary doctor next week.  If you are worsening return to the ER.

## 2025-05-27 NOTE — TELEPHONE ENCOUNTER
WILI FROM [facility] [employer] CALLED IN TO RELAY INFO IN REGARDS TO THE PATIENTS PA IN REGARDS TO PHYSICAL THERAPY THAT IT WAS DENIED DUE TO RECORDS NOT SHOWING THAT RANGE OF MOTION HAS IMPROVED    [phone]  
Resulted

## 2025-06-17 ENCOUNTER — HOSPITAL ENCOUNTER (EMERGENCY)
Age: 55
Discharge: HOME | End: 2025-06-17
Attending: EMERGENCY MEDICINE
Payer: COMMERCIAL

## 2025-06-17 VITALS
WEIGHT: 180 LBS | DIASTOLIC BLOOD PRESSURE: 86 MMHG | HEIGHT: 72 IN | RESPIRATION RATE: 18 BRPM | OXYGEN SATURATION: 100 % | HEART RATE: 55 BPM | TEMPERATURE: 97.7 F | SYSTOLIC BLOOD PRESSURE: 128 MMHG | BODY MASS INDEX: 24.38 KG/M2

## 2025-06-17 DIAGNOSIS — S39.012A STRAIN OF LUMBAR REGION, INITIAL ENCOUNTER: Primary | ICD-10-CM

## 2025-06-17 PROCEDURE — 99284 EMERGENCY DEPT VISIT MOD MDM: CPT

## 2025-06-17 PROCEDURE — 6360000002 HC RX W HCPCS: Performed by: EMERGENCY MEDICINE

## 2025-06-17 PROCEDURE — 96372 THER/PROPH/DIAG INJ SC/IM: CPT

## 2025-06-17 RX ORDER — METHOCARBAMOL 500 MG/1
500 TABLET, FILM COATED ORAL 3 TIMES DAILY
Qty: 21 TABLET | Refills: 0 | Status: SHIPPED | OUTPATIENT
Start: 2025-06-17 | End: 2025-06-27

## 2025-06-17 RX ORDER — KETOROLAC TROMETHAMINE 15 MG/ML
30 INJECTION, SOLUTION INTRAMUSCULAR; INTRAVENOUS ONCE
Status: COMPLETED | OUTPATIENT
Start: 2025-06-17 | End: 2025-06-17

## 2025-06-17 RX ORDER — LIDOCAINE 4 G/G
1 PATCH TOPICAL DAILY
Qty: 30 PATCH | Refills: 0 | Status: SHIPPED | OUTPATIENT
Start: 2025-06-17 | End: 2025-07-17

## 2025-06-17 RX ADMIN — KETOROLAC TROMETHAMINE 30 MG: 15 INJECTION, SOLUTION INTRAMUSCULAR; INTRAVENOUS at 07:50

## 2025-06-17 ASSESSMENT — PAIN - FUNCTIONAL ASSESSMENT
PAIN_FUNCTIONAL_ASSESSMENT: 0-10
PAIN_FUNCTIONAL_ASSESSMENT: 0-10

## 2025-06-17 ASSESSMENT — PAIN SCALES - GENERAL
PAINLEVEL_OUTOF10: 7
PAINLEVEL_OUTOF10: 7

## 2025-06-17 ASSESSMENT — PAIN DESCRIPTION - DESCRIPTORS: DESCRIPTORS: ACHING

## 2025-06-17 ASSESSMENT — PAIN DESCRIPTION - PAIN TYPE: TYPE: ACUTE PAIN

## 2025-06-17 ASSESSMENT — PAIN DESCRIPTION - ORIENTATION: ORIENTATION: LOWER

## 2025-06-17 ASSESSMENT — PAIN DESCRIPTION - LOCATION: LOCATION: BACK

## 2025-06-17 ASSESSMENT — PAIN DESCRIPTION - FREQUENCY: FREQUENCY: CONTINUOUS

## 2025-06-17 NOTE — DISCHARGE INSTRUCTIONS
Take ibuprofen 3 tablets over the counter 3 times daily w/ meals.   Use Robaxin 3 times daily as needed  Use Lidoderm patch as needed for pain    NSTRUCTION SHEETS GIVEN:    DIAGNOSIS:  The encounter diagnosis was Strain of lumbar region, initial encounter.        ADDITIONAL INSTRUCTIONS FOR ALL PATIENTS:  -If you have been prescribed an antibiotic TAKE IT AS DIRECTED UNTIL IT IS ALL FINISHED.  -If you HAVE RECEIVED OR BEEN PRESCRIBED A MEDICATION THAT MAY CAUSE DROWSINESS. DO NOT DRIVE, DRINK ALCOHOL, OR OPERATE MACHINERY THAT REQUIRES YOU TO BE ALERT.    -If you had an EKG and/or X-Ray reading made in the Emergency Department, it will be reviewed by a Cardiologist and/or Radiologist. If the review changes your diagnosis, you will be contacted.  -If you had a specimen collected for culture, a CULTURE REPORT takes 48-72 hours to generate: You will be contacted if a change in treatment is needed.     Make appointment with your primary care provider as a result of this emergency visit so that you can be reevaluated and follow up with any delayed test results.     Return immediately to the emergency department if your condition worsens or if you have severe or worsening pain or worsening symptoms such as fever, vomiting, or difficulty breathing, chest or abdominal pain or if you feel worse in any way.

## 2025-06-17 NOTE — ED PROVIDER NOTES
HPI:   Patient information was obtained from PATIENT    Patient presented to the Emergency Department with complaint of back pain.      Chief Complaint     Patient presents with complaint of ACUTE back pain.  This is a result of lifting heavy object.  Patient states he got a 0 turn mower stuck in a ditch yesterday and had to lift it out, had sudden pain in his low back left worse on the right since then and has felt like it has been \"locking up ever since then.  Onset of pain was 24 hours ago and has been WORSENED since. The pain is located in LOWER BACK , described as aching, dull, and throbbing and rated as SEVERE, without radiation. Symptoms include tingling. .   States pain is worse with movement    Associated symptoms: numbness of the left leg intermittently and not currently present, denies  weakness of the extremities, no diff walking, no  urinary retention, Has  not had urinary or stool incontinence, or unexplained weight loss  Has  not had fever      Review of Systems  Pertinent factors in HPI.  Other systems reviewed and negative.  PAST MEDICAL HISTORY   has a past medical history of MRSA (methicillin resistant staph aureus) culture positive (09/09/2011), Recurrent otitis media of both ears, and Smoking history.    PAST SURGICAL HISTORY   has a past surgical history that includes Tympanostomy tube placement; orthopedic surgery; fracture surgery (Left); and Shoulder arthroscopy (Right, 1/29/2024).    FAMILY HISTORY  family history includes Asthma in his father; Diabetes in his father; Heart Attack in his father; High Cholesterol in his father; Hypertension in his father; Mult Sclerosis in his maternal grandfather; No Known Problems in his brother.    SOCIAL HISTORY   reports that he has been smoking cigarettes. He started smoking about 37 years ago. He has a 37.5 pack-year smoking history. He has quit using smokeless tobacco.  His smokeless tobacco use included chew. He reports current alcohol use of about

## 2025-06-19 ENCOUNTER — OFFICE VISIT (OUTPATIENT)
Dept: INTERNAL MEDICINE CLINIC | Age: 55
End: 2025-06-19
Payer: COMMERCIAL

## 2025-06-19 VITALS
BODY MASS INDEX: 23.98 KG/M2 | WEIGHT: 177 LBS | SYSTOLIC BLOOD PRESSURE: 114 MMHG | OXYGEN SATURATION: 98 % | TEMPERATURE: 97.8 F | HEIGHT: 72 IN | DIASTOLIC BLOOD PRESSURE: 76 MMHG | HEART RATE: 77 BPM

## 2025-06-19 DIAGNOSIS — M54.42 ACUTE LEFT-SIDED LOW BACK PAIN WITH LEFT-SIDED SCIATICA: Primary | ICD-10-CM

## 2025-06-19 PROCEDURE — 4004F PT TOBACCO SCREEN RCVD TLK: CPT | Performed by: NURSE PRACTITIONER

## 2025-06-19 PROCEDURE — 3017F COLORECTAL CA SCREEN DOC REV: CPT | Performed by: NURSE PRACTITIONER

## 2025-06-19 PROCEDURE — G8427 DOCREV CUR MEDS BY ELIG CLIN: HCPCS | Performed by: NURSE PRACTITIONER

## 2025-06-19 PROCEDURE — 99214 OFFICE O/P EST MOD 30 MIN: CPT | Performed by: NURSE PRACTITIONER

## 2025-06-19 PROCEDURE — G8420 CALC BMI NORM PARAMETERS: HCPCS | Performed by: NURSE PRACTITIONER

## 2025-06-19 RX ORDER — PREDNISONE 10 MG/1
TABLET ORAL
Qty: 30 TABLET | Refills: 0 | Status: SHIPPED | OUTPATIENT
Start: 2025-06-19

## 2025-06-19 SDOH — ECONOMIC STABILITY: FOOD INSECURITY: WITHIN THE PAST 12 MONTHS, THE FOOD YOU BOUGHT JUST DIDN'T LAST AND YOU DIDN'T HAVE MONEY TO GET MORE.: NEVER TRUE

## 2025-06-19 SDOH — ECONOMIC STABILITY: FOOD INSECURITY: WITHIN THE PAST 12 MONTHS, YOU WORRIED THAT YOUR FOOD WOULD RUN OUT BEFORE YOU GOT MONEY TO BUY MORE.: NEVER TRUE

## 2025-06-19 ASSESSMENT — ENCOUNTER SYMPTOMS
BACK PAIN: 1
SHORTNESS OF BREATH: 0
NAUSEA: 0
DIARRHEA: 0
CONSTIPATION: 0
SORE THROAT: 0
COUGH: 0
CHEST TIGHTNESS: 0
VOMITING: 0
SINUS PAIN: 0

## 2025-06-19 NOTE — PROGRESS NOTES
Juan Miguel Reyes (:  1970) is a 54 y.o. male, here for evaluation of the following chief complaint(s):  Back Pain (LBP injured on .  [facility]  )       Assessment & Plan  1. Left-sided low back pain.  - Symptoms suggest a potential strain or disc injury in the lumbar region, with pain radiating up through the middle of the spine and exacerbated by physical activity.  - Increased pain and limited mobility.  - Discussed the use of muscle relaxer (Robaxin) and a 12-day course of steroids to reduce inflammation. Advised to avoid activities that strain the back, such as lifting and twisting.  - Gentle stretching exercises suggested once some relief is experienced. Heat therapy recommended. Work note provided for return on 2025. X-ray of the lumbar spine ordered for further evaluation if pain persists beyond a week and a half.    Results    1. Acute left-sided low back pain with left-sided sciatica  -     predniSONE (DELTASONE) 10 MG tablet; Take 40 mg for 3 days then 30 mg for 3 days then 20 mg for 3 days then 10 mg for 3 days, Disp-30 tablet, R-0Normal  -     XR LUMBAR SPINE (2-3 VIEWS); Future    No follow-ups on file.  Subjective   History of Present Illness  The patient presents for evaluation of left-sided low back pain.    He reports experiencing soreness and fatigue, which he attributes to an incident on 2025 where he lifted a heavy object using his legs. Although he initially felt discomfort, he expected it to subside. However, the pain intensified during sleep on 2025, waking him up twice due to its severity. On 2025, he sought medical attention at the emergency room where he received an injection in his arm, but it did not alleviate his symptoms. He was advised to consult with his primary care physician and was prescribed ibuprofen and Robaxin, a medication he is unfamiliar with. He describes the pain as a sensation of two plates rubbing together, similar to a

## 2025-06-19 NOTE — PATIENT INSTRUCTIONS
Your time is valuable!     Did you know that you can schedule appointments with your provider via [employer]?   This is a great feature that allows you to take control of your schedule and make appointments when you want them without having to spend time on hold or speaking to the scheduling center.     Available visits:   Office visits - A general visit with your health care provider  Annual physicals - A complete physical exam  Medicare Annual Wellness Visits    Don't see a visit type for what you need - You can request a visit which will come directly to the practice - you can set days/times which work best for you and avoid back and forth messages and calls with the office.     Don't have a MyChart, please see the  today to set you up and start taking charge of your scheduling needs!     Try it out today!

## 2025-06-22 ENCOUNTER — HOSPITAL ENCOUNTER (OUTPATIENT)
Age: 55
Discharge: HOME | End: 2025-06-22
Payer: COMMERCIAL

## 2025-06-22 ENCOUNTER — HOSPITAL ENCOUNTER (OUTPATIENT)
Dept: GENERAL RADIOLOGY | Age: 55
Discharge: HOME | End: 2025-06-22
Payer: COMMERCIAL

## 2025-06-22 DIAGNOSIS — M54.42 ACUTE LEFT-SIDED LOW BACK PAIN WITH LEFT-SIDED SCIATICA: ICD-10-CM

## 2025-06-22 PROCEDURE — 72100 X-RAY EXAM L-S SPINE 2/3 VWS: CPT

## 2025-06-23 ENCOUNTER — TELEPHONE (OUTPATIENT)
Dept: INTERNAL MEDICINE CLINIC | Age: 55
End: 2025-06-23

## 2025-06-23 NOTE — TELEPHONE ENCOUNTER
Patient is asking for a work note for tomorrow.     Patient is coming in for an appt and will review his x-rays.     Placed note for work note in appt notes

## 2025-06-24 ENCOUNTER — OFFICE VISIT (OUTPATIENT)
Dept: INTERNAL MEDICINE CLINIC | Age: 55
End: 2025-06-24
Payer: COMMERCIAL

## 2025-06-24 VITALS
DIASTOLIC BLOOD PRESSURE: 86 MMHG | OXYGEN SATURATION: 98 % | TEMPERATURE: 97.2 F | SYSTOLIC BLOOD PRESSURE: 128 MMHG | HEART RATE: 77 BPM | WEIGHT: 182.4 LBS | HEIGHT: 72 IN | BODY MASS INDEX: 24.71 KG/M2

## 2025-06-24 DIAGNOSIS — M54.42 ACUTE LEFT-SIDED LOW BACK PAIN WITH LEFT-SIDED SCIATICA: Primary | ICD-10-CM

## 2025-06-24 PROCEDURE — 99214 OFFICE O/P EST MOD 30 MIN: CPT | Performed by: NURSE PRACTITIONER

## 2025-06-24 PROCEDURE — 3017F COLORECTAL CA SCREEN DOC REV: CPT | Performed by: NURSE PRACTITIONER

## 2025-06-24 PROCEDURE — 4004F PT TOBACCO SCREEN RCVD TLK: CPT | Performed by: NURSE PRACTITIONER

## 2025-06-24 PROCEDURE — G8427 DOCREV CUR MEDS BY ELIG CLIN: HCPCS | Performed by: NURSE PRACTITIONER

## 2025-06-24 PROCEDURE — G8420 CALC BMI NORM PARAMETERS: HCPCS | Performed by: NURSE PRACTITIONER

## 2025-06-24 ASSESSMENT — ENCOUNTER SYMPTOMS
SORE THROAT: 0
SINUS PAIN: 0
DIARRHEA: 0
CHEST TIGHTNESS: 0
BACK PAIN: 1
COUGH: 0
NAUSEA: 0
VOMITING: 0
SHORTNESS OF BREATH: 0
CONSTIPATION: 0

## 2025-06-24 NOTE — PROGRESS NOTES
Juan Miguel Reyes (:  1970) is a 54 y.o. male, here for evaluation of the following chief complaint(s):  Other (Review X-Rays needs work note )       Assessment & Plan  1. Back pain.  - The patient's back pain is likely due to a combination of factors, including potential nerve impingement and possible fracture at L5-S1. The x-ray findings suggest significant wear and tear, possibly from cumulative incidents.  - The patient reports intermittent numbness, which is concerning but not constant.  - He is advised to continue his current regimen of steroids and muscle relaxers as needed. An MRI of the spine is recommended to further evaluate the condition. He is encouraged to seek an orthopedic consultation for additional guidance.  - He is also advised to monitor for any numbness extending towards the groin or any instances of incontinence, which would necessitate immediate hospital admission. A work note will be provided for his convenience.    Results  Imaging   - X-ray of the lumbar spine: Potential bulging disk at L2-L3 and significant issues at L5-S1, including possible nerve pinching and a suspected fracture.  1. Acute left-sided low back pain with left-sided sciatica  -     MRI LUMBAR SPINE WO CONTRAST; Future  -     [facility]    No follow-ups on file.  Subjective   History of Present Illness  The patient presents for evaluation of back pain.    He reports experiencing throbbing back pain, which he attributes to an incident where he rolled over in bed last night. The pain is primarily located in the lower back, specifically around the L5-S1 region. He mentions that his job involves heavy lifting, which exacerbates his back pain. 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 He describes the pain as severe enough to wake him up at night, particularly

## 2025-06-25 ENCOUNTER — OFFICE VISIT (OUTPATIENT)
Dept: ORTHOPEDIC SURGERY | Age: 55
End: 2025-06-25
Payer: COMMERCIAL

## 2025-06-25 VITALS — WEIGHT: 182 LBS | HEIGHT: 72 IN | BODY MASS INDEX: 24.65 KG/M2

## 2025-06-25 DIAGNOSIS — M51.360 DEGENERATION OF INTERVERTEBRAL DISC OF LUMBAR REGION WITH DISCOGENIC BACK PAIN: Primary | ICD-10-CM

## 2025-06-25 DIAGNOSIS — S39.012A STRAIN OF LUMBAR REGION, INITIAL ENCOUNTER: ICD-10-CM

## 2025-06-25 DIAGNOSIS — M47.816 FACET ARTHROPATHY, LUMBAR: ICD-10-CM

## 2025-06-25 PROCEDURE — G8427 DOCREV CUR MEDS BY ELIG CLIN: HCPCS | Performed by: PHYSICIAN ASSISTANT

## 2025-06-25 PROCEDURE — 99214 OFFICE O/P EST MOD 30 MIN: CPT | Performed by: PHYSICIAN ASSISTANT

## 2025-06-25 PROCEDURE — G8420 CALC BMI NORM PARAMETERS: HCPCS | Performed by: PHYSICIAN ASSISTANT

## 2025-06-25 PROCEDURE — 3017F COLORECTAL CA SCREEN DOC REV: CPT | Performed by: PHYSICIAN ASSISTANT

## 2025-06-25 PROCEDURE — 4004F PT TOBACCO SCREEN RCVD TLK: CPT | Performed by: PHYSICIAN ASSISTANT

## 2025-06-25 NOTE — PROGRESS NOTES
New Patient: LUMBAR SPINE    Referring Provider:  Macario Boyer APRN - CNP    CHIEF COMPLAINT:    Chief Complaint   Patient presents with    Back Pain     lumbar       HISTORY OF PRESENT ILLNESS:       Mr. Juan Miguel Reyes  is a pleasant 54 y.o. male here for evaluation regarding his LBP. He states his pain began after lifting a 0 turn lawnmower on 6/16/2025.  Patient was seen in the ED where he was evaluated and discharged with prednisone and a muscle relaxant.  He states he continues to take the steroid and muscle relaxant with minimal benefit.  He states that the pain is mainly at his waist and does radiate up into the lumbar spine.  He denies any radicular symptoms into either lower extremity and denies any weakness, numbness, or tingling into either lower extremity.  He denies any bowel or bladder dysfunction or saddle anesthesia.  He states that at the present time his back pain is 7/10 with no buttock or leg pain.  He states the pain is constant last throughout the day and does interfere with his sleep at night.  He finds that sitting is better than standing and lying.  He has had no other recent treatment for his back pain.    Pain Assessment  Location of Pain: Back  Location Modifiers: Other (Comment)  Severity of Pain: 8  Quality of Pain: Other (Comment)  Duration of Pain: Other (Comment)  Frequency of Pain: Other (Comment)]  Current/Past Treatment:   Physical Therapy: None  Chiropractic: None  Injection: None  Medications: Prednisone and Robaxin    Past Medical History:   Past Medical History:   Diagnosis Date    MRSA (methicillin resistant staph aureus) culture positive 09/09/2011    Finger positive for MRSA    Recurrent otitis media of both ears     Smoking history         Past Surgical History:     Past Surgical History:   Procedure Laterality Date    FRACTURE SURGERY Left     ORTHOPEDIC SURGERY      L elbow    SHOULDER ARTHROSCOPY Right 1/29/2024    RIGHT SHOULDER VIDEO ARTHROSCOPY, ROTATOR CUFF REPAIR

## 2025-06-27 ENCOUNTER — TELEPHONE (OUTPATIENT)
Dept: INTERNAL MEDICINE CLINIC | Age: 55
End: 2025-06-27

## 2025-06-27 RX ORDER — METHOCARBAMOL 500 MG/1
500 TABLET, FILM COATED ORAL 3 TIMES DAILY
Qty: 42 TABLET | Refills: 0 | Status: SHIPPED | OUTPATIENT
Start: 2025-06-27 | End: 2025-07-11

## 2025-07-01 ENCOUNTER — HOSPITAL ENCOUNTER (OUTPATIENT)
Dept: PHYSICAL THERAPY | Age: 55
Setting detail: THERAPIES SERIES
Discharge: HOME | End: 2025-07-01
Payer: COMMERCIAL

## 2025-07-01 PROCEDURE — 97140 MANUAL THERAPY 1/> REGIONS: CPT

## 2025-07-01 PROCEDURE — 97110 THERAPEUTIC EXERCISES: CPT

## 2025-07-01 PROCEDURE — 97161 PT EVAL LOW COMPLEX 20 MIN: CPT

## 2025-07-01 NOTE — PLAN OF CARE
(Mobilization/manipulation, manual lymphatic drainage, manual traction) for the purpose of modulating pain, promoting relaxation,  increasing ROM, reducing/eliminating soft tissue swelling/inflammation/restriction, improving soft tissue extensibility and allowing for proper ROM for normal function with self care, mobility, lifting and ambulation    GOALS     Patient stated goal: To be able to get back to work without limitations.   [] Progressing: [] Met: [] Not Met: [] Adjusted    Therapist goals for Patient:   Short Term Goals: To be achieved in: 2 weeks  Independent in HEP and progression per patient tolerance, in order to prevent re-injury.   [] Progressing: [] Met: [] Not Met: [] Adjusted  Patient will have a decrease in pain to <2/10 to facilitate improvement in movement, function, and ADLs as indicated by Functional Deficits.  [] Progressing: [] Met: [] Not Met: [] Adjusted    IF APPLICABLE:  [] Patient to demonstrate independence in wear and care for custom orthotic device. (Only if applicable for orthotic eval)     Long Term Goals: To be achieved in: 4-6 weeks  Disability index score of 10 % or less for the Modified Oswestry to assist with reaching prior level of function with activities such as squatting.  [] Progressing: [] Met: [] Not Met: [] Adjusted  Patient will demonstrate increased AROM of lumbar to WNL without pain to allow for proper joint functioning to enable patient to walk without limitations.   [] Progressing: [] Met: [] Not Met: [] Adjusted  Patient will demonstrate increased Strength of LE's to at least 4+/5 throughout without pain to allow for proper functional mobility to enable patient to return to standing for at least 4 hours at a time for occupational activities.   [] Progressing: [] Met: [] Not Met: [] Adjusted    Overall Progression Towards Functional goals/ Treatment Progress Update:  [] Patient is progressing as expected towards functional goals listed.    [] Progression is slowed

## 2025-07-02 ENCOUNTER — TELEPHONE (OUTPATIENT)
Dept: INTERNAL MEDICINE CLINIC | Age: 55
End: 2025-07-02

## 2025-07-02 NOTE — TELEPHONE ENCOUNTER
Patient states [employer]  has faxed over short term disability paper work for you to fill out regarding his work injury. He needs that sent ASAP as he can't get pain until they receive the  paperwork.  Thank you    On your desk

## 2025-07-03 ENCOUNTER — HOSPITAL ENCOUNTER (OUTPATIENT)
Dept: MRI IMAGING | Age: 55
Discharge: HOME | End: 2025-07-03
Payer: COMMERCIAL

## 2025-07-03 DIAGNOSIS — M54.42 ACUTE LEFT-SIDED LOW BACK PAIN WITH LEFT-SIDED SCIATICA: ICD-10-CM

## 2025-07-03 PROCEDURE — 72148 MRI LUMBAR SPINE W/O DYE: CPT

## 2025-07-08 ENCOUNTER — TELEPHONE (OUTPATIENT)
Dept: INTERNAL MEDICINE CLINIC | Age: 55
End: 2025-07-08

## 2025-07-08 ENCOUNTER — RESULTS FOLLOW-UP (OUTPATIENT)
Dept: INTERNAL MEDICINE CLINIC | Age: 55
End: 2025-07-08

## 2025-07-08 NOTE — TELEPHONE ENCOUNTER
----- Message from Jj LAINEZ sent at 7/8/2025  1:54 PM EDT -----  Regarding: E[facility] Appointment Request  E[facility] Appointment Request    Patient needs appointment for ECC Appointment Type: Existing Condition Follow Up.    Patient Requested Dates(s): 07/10/2025  Patient Requested Time: Morning  Provider Name:MerlinMacario APRN    Reason for Appointment Request: Established Patient - No appointments available during search  --------------------------------------------------------------------------------------------------------------------------    Relationship to Patient: Self     Call Back Information: OK to leave message on voicemail  Preferred Call Back Number: Phone [phone]

## 2025-07-09 ENCOUNTER — HOSPITAL ENCOUNTER (OUTPATIENT)
Dept: PHYSICAL THERAPY | Age: 55
Setting detail: THERAPIES SERIES
Discharge: HOME | End: 2025-07-09
Payer: COMMERCIAL

## 2025-07-09 ENCOUNTER — TELEPHONE (OUTPATIENT)
Dept: INTERNAL MEDICINE CLINIC | Age: 55
End: 2025-07-09

## 2025-07-09 ENCOUNTER — OFFICE VISIT (OUTPATIENT)
Dept: INTERNAL MEDICINE CLINIC | Age: 55
End: 2025-07-09
Payer: COMMERCIAL

## 2025-07-09 VITALS
SYSTOLIC BLOOD PRESSURE: 120 MMHG | TEMPERATURE: 98.7 F | WEIGHT: 180 LBS | BODY MASS INDEX: 24.41 KG/M2 | DIASTOLIC BLOOD PRESSURE: 85 MMHG | HEART RATE: 78 BPM | OXYGEN SATURATION: 98 %

## 2025-07-09 DIAGNOSIS — M54.42 ACUTE LEFT-SIDED LOW BACK PAIN WITH LEFT-SIDED SCIATICA: Primary | ICD-10-CM

## 2025-07-09 PROCEDURE — 97140 MANUAL THERAPY 1/> REGIONS: CPT

## 2025-07-09 PROCEDURE — 3017F COLORECTAL CA SCREEN DOC REV: CPT | Performed by: NURSE PRACTITIONER

## 2025-07-09 PROCEDURE — 4004F PT TOBACCO SCREEN RCVD TLK: CPT | Performed by: NURSE PRACTITIONER

## 2025-07-09 PROCEDURE — G8427 DOCREV CUR MEDS BY ELIG CLIN: HCPCS | Performed by: NURSE PRACTITIONER

## 2025-07-09 PROCEDURE — G8420 CALC BMI NORM PARAMETERS: HCPCS | Performed by: NURSE PRACTITIONER

## 2025-07-09 PROCEDURE — 99214 OFFICE O/P EST MOD 30 MIN: CPT | Performed by: NURSE PRACTITIONER

## 2025-07-09 PROCEDURE — 97110 THERAPEUTIC EXERCISES: CPT

## 2025-07-09 RX ORDER — METHOCARBAMOL 500 MG/1
500 TABLET, FILM COATED ORAL 3 TIMES DAILY
Qty: 42 TABLET | Refills: 0 | Status: SHIPPED | OUTPATIENT
Start: 2025-07-09 | End: 2025-07-23

## 2025-07-09 RX ORDER — DICLOFENAC SODIUM 75 MG/1
75 TABLET, DELAYED RELEASE ORAL 2 TIMES DAILY
Qty: 60 TABLET | Refills: 3 | Status: SHIPPED | OUTPATIENT
Start: 2025-07-09

## 2025-07-09 ASSESSMENT — ENCOUNTER SYMPTOMS
BACK PAIN: 1
VOMITING: 0
NAUSEA: 0
SINUS PAIN: 0
COUGH: 0
DIARRHEA: 0
CHEST TIGHTNESS: 0
CONSTIPATION: 0
SORE THROAT: 0
SHORTNESS OF BREATH: 0

## 2025-07-09 NOTE — TELEPHONE ENCOUNTER
----- Message from Jj LAINEZ sent at 7/8/2025  1:54 PM EDT -----  Regarding: E[facility] Appointment Request  ECC Appointment Request    Patient needs appointment for ECC Appointment Type: Existing Condition Follow Up.    Patient Requested Dates(s): 07/10/2025  Patient Requested Time: Morning  Provider Name:MerlinMacario APRN    Reason for Appointment Request: Established Patient - No appointments available during search  --------------------------------------------------------------------------------------------------------------------------    Relationship to Patient: Self     Call Back Information: OK to leave message on voicemail  Preferred Call Back Number: Phone [phone]

## 2025-07-09 NOTE — PROGRESS NOTES
Juan Miguel Reyes (:  1970) is a 54 y.o. male, here for evaluation of the following chief complaint(s):  MRI (Review results )       Assessment & Plan  1. Back pain.  - The MRI results indicate arthritic changes between the L4 and L5 vertebrae, as well as between the L5 and S1 vertebrae. These changes are likely contributing to his back pain.  - Physical therapy may help improve flexibility and range of motion, but it is unlikely to completely alleviate the pain. Injections in the affected area could provide significant pain relief.  - He was advised to continue with his current treatment regimen, including the use of muscle relaxers. A prescription for a stronger medication than ibuprofen was provided to manage swelling, inflammation, and pain.  - He was also advised to maintain contact with his orthopedic specialist, Kenrick, for further guidance on his treatment plan. He was cautioned against lifting heavy objects in a dependent position and was encouraged to continue with his stretching exercises as part of his physical therapy.    Results  Imaging   - MRI of the lumbar spine: Arthritic development between L4 and L5, and changes between L5 and S1. The disk at L5-S1 is pinched and eroded.  1. Acute left-sided low back pain with left-sided sciatica  -     methocarbamol (ROBAXIN) 500 MG tablet; Take 1 tablet by mouth 3 times daily for 14 days, Disp-42 tablet, R-0Normal  -     diclofenac (VOLTAREN) 75 MG EC tablet; Take 1 tablet by mouth 2 times daily, Disp-60 tablet, R-3Normal    No follow-ups on file.  Subjective   History of Present Illness  The patient presents for back pain.    He reports persistent soreness in his back, which is exacerbated by certain sitting and lying positions. Additionally, he experiences a tingling sensation in his leg, although he can still walk. Occasionally, the pain intensifies upon standing, causing him to limp. He describes a popping sensation in his back when he stands,

## 2025-07-09 NOTE — FLOWSHEET NOTE
[facility] - Outpatient Rehabilitation and Therapy: [address] [place] [address], [address], [place] [id number] office: [phone] fax: [phone]         Physical Therapy: TREATMENT/PROGRESS NOTE   Patient: Juan Miguel Reyes (54 y.o. male)   Examination Date: 2025   :  1970 MRN: [id number]   Visit #: 2   Insurance Allowable Auth Needed   20v/hard max []Yes    [x]No    Insurance: Payor: [employer] / Plan: [employer] [address] / Product Type: *No Product type* /   Insurance ID: [id number] - (Commercial)  Secondary Insurance (if applicable):    Treatment Diagnosis: LBP, limited mobility and strength deficits.  No diagnosis found.   Medical Diagnosis:  Degeneration of intervertebral disc of lumbar region with discogenic back pain [M51.360]  Facet arthropathy, lumbar [M47.816]  Strain of lumbar region, initial encounter [S39.012A]   Referring Physician: Bran Marie,*  PCP: Macario Boyer APRN - CNP     Plan of care signed (Y/N): Y    Date of Patient follow up with Physician: XUAN     Plan of Care Report: NO  POC update due: (10 visits /OR AUTH LIMITS, whichever is less)   2025                                             Medical History:  Comorbidities:  None  Relevant Medical History:   Past Medical History:   Diagnosis Date    MRSA (methicillin resistant staph aureus) culture positive 2011    Finger positive for MRSA    Recurrent otitis media of both ears     Smoking history                                             Precautions/ Contra-indications:           Latex allergy:  NO  Pacemaker:    NO  Contraindications for Manipulation: None  Date of Surgery:    Other:    Red Flags:  None    Suicide Screening:   The patient did not verbalize a primary behavioral concern, suicidal ideation, suicidal intent, or demonstrate suicidal behaviors.    Preferred Language for Healthcare:   [x] English       [] other:    SUBJECTIVE EXAMINATION     Patient stated complaint:   reports he came

## 2025-07-11 ENCOUNTER — HOSPITAL ENCOUNTER (OUTPATIENT)
Dept: PHYSICAL THERAPY | Age: 55
Setting detail: THERAPIES SERIES
Discharge: HOME | End: 2025-07-11
Payer: COMMERCIAL

## 2025-07-11 DIAGNOSIS — R53.1 WEAKNESS: ICD-10-CM

## 2025-07-11 DIAGNOSIS — M25.60 JOINT STIFFNESS OF SPINE: ICD-10-CM

## 2025-07-11 DIAGNOSIS — M54.42 BILATERAL LOW BACK PAIN WITH LEFT-SIDED SCIATICA, UNSPECIFIED CHRONICITY: Primary | ICD-10-CM

## 2025-07-11 PROCEDURE — 97140 MANUAL THERAPY 1/> REGIONS: CPT

## 2025-07-11 PROCEDURE — 97110 THERAPEUTIC EXERCISES: CPT

## 2025-07-11 PROCEDURE — 97530 THERAPEUTIC ACTIVITIES: CPT

## 2025-07-11 NOTE — FLOWSHEET NOTE
[facility] - Outpatient Rehabilitation and Therapy: [address] [place] [address], [address], [place] [id number] office: [phone] fax: [phone]         Physical Therapy: TREATMENT/PROGRESS NOTE   Patient: Juan Miguel Reyes (54 y.o. male)   Examination Date: 2025   :  1970 MRN: [id number]   Visit #: 3   Insurance Allowable Auth Needed   20v/hard max []Yes    [x]No    Insurance: Payor: [employer] / Plan: MED[employer] M[employer]UAL [address] / Product Type: *No Product type* /   Insurance ID: [id number] - (Commercial)  Secondary Insurance (if applicable):    Treatment Diagnosis: LBP, limited mobility and strength deficits.    ICD-10-CM    1. Bilateral low back pain with left-sided sciatica, unspecified chronicity  M54.42       2. Weakness  R53.1       3. Joint stiffness of spine  M25.60          Medical Diagnosis:  Degeneration of intervertebral disc of lumbar region with discogenic back pain [M51.360]  Facet arthropathy, lumbar [M47.816]  Strain of lumbar region, initial encounter [S39.012A]   Referring Physician: Bran Marie,*  PCP: Macario Boyer APRN - CNP     Plan of care signed (Y/N): Y    Date of Patient follow up with Physician: XUAN     Plan of Care Report: NO  POC update due: (10 visits /OR AUTH LIMITS, whichever is less)   2025                                             Medical History:  Comorbidities:  None  Relevant Medical History:   Past Medical History:   Diagnosis Date    MRSA (methicillin resistant staph aureus) culture positive 2011    Finger positive for MRSA    Recurrent otitis media of both ears     Smoking history                                             Precautions/ Contra-indications:           Latex allergy:  NO  Pacemaker:    NO  Contraindications for Manipulation: None  Date of Surgery:    Other:    Red Flags:  None    Suicide Screening:   The patient did not verbalize a primary behavioral concern, suicidal ideation, suicidal intent, or demonstrate suicidal

## 2025-07-15 ENCOUNTER — OFFICE VISIT (OUTPATIENT)
Dept: ORTHOPEDIC SURGERY | Age: 55
End: 2025-07-15
Payer: COMMERCIAL

## 2025-07-15 ENCOUNTER — HOSPITAL ENCOUNTER (OUTPATIENT)
Dept: PHYSICAL THERAPY | Age: 55
Setting detail: THERAPIES SERIES
Discharge: HOME | End: 2025-07-15
Payer: COMMERCIAL

## 2025-07-15 VITALS — WEIGHT: 180 LBS | BODY MASS INDEX: 24.38 KG/M2 | HEIGHT: 72 IN

## 2025-07-15 DIAGNOSIS — M47.816 FACET ARTHROPATHY, LUMBAR: ICD-10-CM

## 2025-07-15 DIAGNOSIS — M48.061 FORAMINAL STENOSIS OF LUMBAR REGION: ICD-10-CM

## 2025-07-15 DIAGNOSIS — M51.360 DEGENERATION OF INTERVERTEBRAL DISC OF LUMBAR REGION WITH DISCOGENIC BACK PAIN: Primary | ICD-10-CM

## 2025-07-15 PROCEDURE — 4004F PT TOBACCO SCREEN RCVD TLK: CPT | Performed by: PHYSICIAN ASSISTANT

## 2025-07-15 PROCEDURE — G8427 DOCREV CUR MEDS BY ELIG CLIN: HCPCS | Performed by: PHYSICIAN ASSISTANT

## 2025-07-15 PROCEDURE — 3017F COLORECTAL CA SCREEN DOC REV: CPT | Performed by: PHYSICIAN ASSISTANT

## 2025-07-15 PROCEDURE — 97110 THERAPEUTIC EXERCISES: CPT

## 2025-07-15 PROCEDURE — G8420 CALC BMI NORM PARAMETERS: HCPCS | Performed by: PHYSICIAN ASSISTANT

## 2025-07-15 PROCEDURE — 99214 OFFICE O/P EST MOD 30 MIN: CPT | Performed by: PHYSICIAN ASSISTANT

## 2025-07-15 PROCEDURE — 97530 THERAPEUTIC ACTIVITIES: CPT

## 2025-07-15 PROCEDURE — 97140 MANUAL THERAPY 1/> REGIONS: CPT

## 2025-07-15 NOTE — PROGRESS NOTES
New Patient: LUMBAR SPINE    Referring Provider:  No ref. provider found    CHIEF COMPLAINT:    Chief Complaint   Patient presents with    Back Pain     lumbar       HISTORY OF PRESENT ILLNESS:       Mr. Juan Miguel Reyes  is a pleasant 54 y.o. male here for evaluation regarding his LBP and to review his lumbar MRI.  Patient has started outpatient physical therapy twice a week in [place].  His PCP is recently prescribed Robaxin and Voltaren for his pain.  Pain Assessment  Location of Pain: Back  Location Modifiers: Other (Comment)  Severity of Pain: 6  Quality of Pain: Other (Comment)  Duration of Pain: Other (Comment)  Frequency of Pain: Other (Comment)  Aggravating Factors: Other (Comment)]    6/25/2025  He states his pain began after lifting a 0 turn lawnmower on 6/16/2025.  Patient was seen in the ED where he was evaluated and discharged with prednisone and a muscle relaxant.  He states he continues to take the steroid and muscle relaxant with minimal benefit.  He states that the pain is mainly at his waist and does radiate up into the lumbar spine.  He denies any radicular symptoms into either lower extremity and denies any weakness, numbness, or tingling into either lower extremity.  He denies any bowel or bladder dysfunction or saddle anesthesia.  He states that at the present time his back pain is 7/10 with no buttock or leg pain.  He states the pain is constant last throughout the day and does interfere with his sleep at night.  He finds that sitting is better than standing and lying.  He has had no other recent treatment for his back pain.      Current/Past Treatment:   Physical Therapy: None  Chiropractic: None  Injection: None  Medications: Prednisone and Robaxin    Past Medical History:   Past Medical History:   Diagnosis Date    MRSA (methicillin resistant staph aureus) culture positive 09/09/2011    Finger positive for MRSA    Recurrent otitis media of both ears     Smoking history         Past Surgical

## 2025-07-15 NOTE — FLOWSHEET NOTE
[facility] - Outpatient Rehabilitation and Therapy: [address] [place] [address], [address], [place] [id number] office: [phone] fax: [phone]         Physical Therapy: TREATMENT/PROGRESS NOTE   Patient: Juan Miguel Reyes (54 y.o. male)   Examination Date: 07/15/2025   :  1970 MRN: [id number]   Visit #: 4   Insurance Allowable Auth Needed   20v/hard max []Yes    [x]No    Insurance: Payor: [employer] / Plan: [employer] [address] / Product Type: *No Product type* /   Insurance ID: [id number] - (Commercial)  Secondary Insurance (if applicable):    Treatment Diagnosis: LBP, limited mobility and strength deficits.    ICD-10-CM    1. Bilateral low back pain with left-sided sciatica, unspecified chronicity  M54.42       2. Weakness  R53.1       3. Joint stiffness of spine  M25.60          Medical Diagnosis:  Degeneration of intervertebral disc of lumbar region with discogenic back pain [M51.360]  Facet arthropathy, lumbar [M47.816]  Strain of lumbar region, initial encounter [S39.012A]   Referring Physician: Bran Marie,*  PCP: Macario Boyer APRN - CNP     Plan of care signed (Y/N): Y    Date of Patient follow up with Physician: XUAN     Plan of Care Report: NO  POC update due: (10 visits /OR AUTH LIMITS, whichever is less)   2025                                             Medical History:  Comorbidities:  None  Relevant Medical History:   Past Medical History:   Diagnosis Date    MRSA (methicillin resistant staph aureus) culture positive 2011    Finger positive for MRSA    Recurrent otitis media of both ears     Smoking history                                             Precautions/ Contra-indications:           Latex allergy:  NO  Pacemaker:    NO  Contraindications for Manipulation: None  Date of Surgery:    Other:    Red Flags:  None    Suicide Screening:   The patient did not verbalize a primary behavioral concern, suicidal ideation, suicidal intent, or demonstrate suicidal

## 2025-07-18 ENCOUNTER — HOSPITAL ENCOUNTER (OUTPATIENT)
Dept: PHYSICAL THERAPY | Age: 55
Setting detail: THERAPIES SERIES
Discharge: HOME | End: 2025-07-18
Payer: COMMERCIAL

## 2025-07-18 PROCEDURE — 97112 NEUROMUSCULAR REEDUCATION: CPT

## 2025-07-18 PROCEDURE — 97110 THERAPEUTIC EXERCISES: CPT

## 2025-07-18 PROCEDURE — 97140 MANUAL THERAPY 1/> REGIONS: CPT

## 2025-07-18 NOTE — FLOWSHEET NOTE
[facility] - Outpatient Rehabilitation and Therapy: [address] [place] [address], [address], [place] [id number] office: [phone] fax: [phone]         Physical Therapy: TREATMENT/PROGRESS NOTE   Patient: Juan Miguel Reyes (54 y.o. male)   Examination Date: 2025   :  1970 MRN: [id number]   Visit #: 5   Insurance Allowable Auth Needed   20v/hard max []Yes    [x]No    Insurance: Payor: [employer] / Plan: MED[employer] M[employer]UAL [address] / Product Type: *No Product type* /   Insurance ID: [id number] - (Commercial)  Secondary Insurance (if applicable):    Treatment Diagnosis: LBP, limited mobility and strength deficits.    ICD-10-CM    1. Bilateral low back pain with left-sided sciatica, unspecified chronicity  M54.42       2. Weakness  R53.1       3. Joint stiffness of spine  M25.60          Medical Diagnosis:  Degeneration of intervertebral disc of lumbar region with discogenic back pain [M51.360]  Facet arthropathy, lumbar [M47.816]  Strain of lumbar region, initial encounter [S39.012A]   Referring Physician: Bran Marie,*  PCP: Macario Boyer APRN - CNP     Plan of care signed (Y/N): Y    Date of Patient follow up with Physician: XUAN     Plan of Care Report: NO  POC update due: (10 visits /OR AUTH LIMITS, whichever is less)   2025                                             Medical History:  Comorbidities:  None  Relevant Medical History:   Past Medical History:   Diagnosis Date    MRSA (methicillin resistant staph aureus) culture positive 2011    Finger positive for MRSA    Recurrent otitis media of both ears     Smoking history                                             Precautions/ Contra-indications:           Latex allergy:  NO  Pacemaker:    NO  Contraindications for Manipulation: None  Date of Surgery:    Other:    Red Flags:  None    Suicide Screening:   The patient did not verbalize a primary behavioral concern, suicidal ideation, suicidal intent, or demonstrate suicidal

## 2025-07-22 ENCOUNTER — HOSPITAL ENCOUNTER (OUTPATIENT)
Dept: PHYSICAL THERAPY | Age: 55
Setting detail: THERAPIES SERIES
Discharge: HOME | End: 2025-07-22
Payer: COMMERCIAL

## 2025-07-22 PROCEDURE — 97110 THERAPEUTIC EXERCISES: CPT

## 2025-07-22 PROCEDURE — 97112 NEUROMUSCULAR REEDUCATION: CPT

## 2025-07-22 PROCEDURE — 97140 MANUAL THERAPY 1/> REGIONS: CPT

## 2025-07-22 NOTE — FLOWSHEET NOTE
[facility] - Outpatient Rehabilitation and Therapy: [address] [place] [address], [address], [place] [id number] office: [phone] fax: [phone]         Physical Therapy: TREATMENT/PROGRESS NOTE   Patient: [place] Miguel Reyes (54 y.o. male)   Examination Date: 2025   :  1970 MRN: [id number]   Visit #: 6   Insurance Allowable Auth Needed   20v/hard max []Yes    [x]No    Insurance: Payor: [employer] / Plan: MED[employer] M[employer]UAL [address] / Product Type: *No Product type* /   Insurance ID: [id number] - (Commercial)  Secondary Insurance (if applicable):    Treatment Diagnosis: LBP, limited mobility and strength deficits.    ICD-10-CM    1. Bilateral low back pain with left-sided sciatica, unspecified chronicity  M54.42       2. Weakness  R53.1       3. Joint stiffness of spine  M25.60          Medical Diagnosis:  Degeneration of intervertebral disc of lumbar region with discogenic back pain [M51.360]  Facet arthropathy, lumbar [M47.816]  Strain of lumbar region, initial encounter [S39.012A]   Referring Physician: Bran Marie,*  PCP: Macario Boyer APRN - CNP     Plan of care signed (Y/N): Y    Date of Patient follow up with Physician: XUAN     Plan of Care Report: NO  POC update due: (10 visits /OR AUTH LIMITS, whichever is less)   2025                                             Medical History:  Comorbidities:  None  Relevant Medical History:   Past Medical History:   Diagnosis Date    MRSA (methicillin resistant staph aureus) culture positive 2011    Finger positive for MRSA    Recurrent otitis media of both ears     Smoking history                                             Precautions/ Contra-indications:           Latex allergy:  NO  Pacemaker:    NO  Contraindications for Manipulation: None  Date of Surgery:    Other:    Red Flags:  None    Suicide Screening:   The patient did not verbalize a primary behavioral concern, suicidal ideation, suicidal intent, or demonstrate suicidal

## 2025-07-24 ENCOUNTER — TELEPHONE (OUTPATIENT)
Dept: ORTHOPEDIC SURGERY | Age: 55
End: 2025-07-24

## 2025-07-25 ENCOUNTER — APPOINTMENT (OUTPATIENT)
Dept: PHYSICAL THERAPY | Age: 55
End: 2025-07-25
Payer: COMMERCIAL

## 2025-07-29 ENCOUNTER — HOSPITAL ENCOUNTER (OUTPATIENT)
Dept: PHYSICAL THERAPY | Age: 55
Setting detail: THERAPIES SERIES
Discharge: HOME | End: 2025-07-29
Payer: COMMERCIAL

## 2025-07-29 PROCEDURE — 97140 MANUAL THERAPY 1/> REGIONS: CPT

## 2025-07-29 PROCEDURE — 97112 NEUROMUSCULAR REEDUCATION: CPT

## 2025-07-29 PROCEDURE — 97110 THERAPEUTIC EXERCISES: CPT

## 2025-07-29 NOTE — FLOWSHEET NOTE
[facility] - Outpatient Rehabilitation and Therapy: [address] [place] [address], [address], [place] [id number] office: [phone] fax: [phone]         Physical Therapy: TREATMENT/PROGRESS NOTE   Patient: [place] Miguel Reyes (55 y.o. male)   Examination Date: 2025   :  1970 MRN: [id number]   Visit #: 7   Insurance Allowable Auth Needed   20v/hard max []Yes    [x]No    Insurance: Payor: [employer] / Plan: MEDICAL M[employer]UAL [address] / Product Type: *No Product type* /   Insurance ID: [id number] - (Commercial)  Secondary Insurance (if applicable):    Treatment Diagnosis: LBP, limited mobility and strength deficits.    ICD-10-CM    1. Bilateral low back pain with left-sided sciatica, unspecified chronicity  M54.42       2. Weakness  R53.1       3. Joint stiffness of spine  M25.60          Medical Diagnosis:  Degeneration of intervertebral disc of lumbar region with discogenic back pain [M51.360]  Facet arthropathy, lumbar [M47.816]  Strain of lumbar region, initial encounter [S39.012A]   Referring Physician: Bran Marie,*  PCP: Macario Boyer APRN - CNP     Plan of care signed (Y/N): Y    Date of Patient follow up with Physician: XUAN     Plan of Care Report: NO  POC update due: (10 visits /OR AUTH LIMITS, whichever is less)   2025                                             Medical History:  Comorbidities:  None  Relevant Medical History:   Past Medical History:   Diagnosis Date    MRSA (methicillin resistant staph aureus) culture positive 2011    Finger positive for MRSA    Recurrent otitis media of both ears     Smoking history                                             Precautions/ Contra-indications:           Latex allergy:  NO  Pacemaker:    NO  Contraindications for Manipulation: None  Date of Surgery:    Other:    Red Flags:  None    Suicide Screening:   The patient did not verbalize a primary behavioral concern, suicidal ideation, suicidal intent, or demonstrate suicidal

## 2025-08-08 ENCOUNTER — TELEPHONE (OUTPATIENT)
Dept: INTERNAL MEDICINE CLINIC | Age: 55
End: 2025-08-08

## 2025-08-26 ENCOUNTER — OFFICE VISIT (OUTPATIENT)
Dept: ORTHOPEDIC SURGERY | Age: 55
End: 2025-08-26

## 2025-08-26 VITALS — BODY MASS INDEX: 24.38 KG/M2 | HEIGHT: 72 IN | WEIGHT: 180 LBS

## 2025-08-26 DIAGNOSIS — M48.061 FORAMINAL STENOSIS OF LUMBAR REGION: ICD-10-CM

## 2025-08-26 DIAGNOSIS — M51.360 DEGENERATION OF INTERVERTEBRAL DISC OF LUMBAR REGION WITH DISCOGENIC BACK PAIN: Primary | ICD-10-CM

## 2025-08-26 DIAGNOSIS — M47.816 FACET ARTHROPATHY, LUMBAR: ICD-10-CM

## 2025-08-26 PROCEDURE — 99213 OFFICE O/P EST LOW 20 MIN: CPT | Performed by: PHYSICIAN ASSISTANT

## (undated) DEVICE — SUTURE FIBERWIRE SZ 2 L38IN NONABSORBABLE BLU WHT BLK AR7201

## (undated) DEVICE — 3M™ IOBAN™ 2 ANTIMICROBIAL INCISE DRAPE 6640EZ: Brand: IOBAN™ 2

## (undated) DEVICE — 3M™ MICROFOAM™ SURGICAL TAPE 4 ROLLS/CARTON 6 CARTONS/CASE 1528-3: Brand: 3M™ MICROFOAM™

## (undated) DEVICE — CANNULA ARTHSCP L3CM ID8MM DBL DAM 1 PC MOLD LO PROF FLNG

## (undated) DEVICE — 1010 S-DRAPE TOWEL DRAPE 10/BX: Brand: STERI-DRAPE™

## (undated) DEVICE — SHOULDER STABILIZATION KIT,                                    DISPOSABLE 12 PER BOX

## (undated) DEVICE — PAD FELT FOR ARTHROVAC FLR SUCT SYS

## (undated) DEVICE — 4.0 CM ACROMIOBLASTER STRAIGHT                                    BURRS, POWER/EP-1, SAGE GREEN, 8000                                    MAXIMUM RPM, PACKAGED 6 PER BOX, STERILE

## (undated) DEVICE — MANIFOLD SURG NEPTUNE WST MGMT

## (undated) DEVICE — 4.5 MM INCISOR PLUS STRAIGHT                                    BLADES, POWER/EP-1, VIOLET, PACKAGED                                    6 PER BOX, STERILE

## (undated) DEVICE — TUBING PMP IRRIG GOFLO

## (undated) DEVICE — WEREWOLF FLOW 90 COBLATION WAND: Brand: COBLATION

## (undated) DEVICE — MEDI-VAC NON-CONDUCTIVE SUCTION TUBING: Brand: CARDINAL HEALTH

## (undated) DEVICE — CANNULA THREADED FLEX 8.0 X 72MM: Brand: CLEAR-TRAC

## (undated) DEVICE — STERILE LATEX POWDER-FREE SURGICAL GLOVESWITH NITRILE COATING: Brand: PROTEXIS

## (undated) DEVICE — Device

## (undated) DEVICE — DRAPE 70X60IN SPLIT IMPERV ADHES STRIP

## (undated) DEVICE — SUTURE NONABSORBABLE BRAIDED 1.3 MM W/ LOOP WHT TIGERLINK

## (undated) DEVICE — SUTURE MCRYL SZ 4-0 L18IN ABSRB UD L19MM PS-2 3/8 CIR PRIM Y496G

## (undated) DEVICE — FIRSTPASS NEEDLE AND SUTURE                                    CAPTURE, 5 PER BOX: Brand: FIRSTPASS